# Patient Record
Sex: MALE | Race: WHITE | NOT HISPANIC OR LATINO | Employment: UNEMPLOYED | URBAN - NONMETROPOLITAN AREA
[De-identification: names, ages, dates, MRNs, and addresses within clinical notes are randomized per-mention and may not be internally consistent; named-entity substitution may affect disease eponyms.]

---

## 2021-03-22 PROBLEM — H93.19 TINNITUS: Status: ACTIVE | Noted: 2021-03-22

## 2021-03-22 PROBLEM — N52.9 ERECTILE DYSFUNCTION: Status: ACTIVE | Noted: 2021-03-22

## 2021-03-22 PROBLEM — E78.5 HYPERLIPIDEMIA: Status: ACTIVE | Noted: 2021-03-22

## 2021-03-22 PROBLEM — K40.90 INGUINAL HERNIA: Status: ACTIVE | Noted: 2021-03-22

## 2021-03-22 PROBLEM — E63.0 ESSENTIAL FATTY ACID (EFA) DEFICIENCY: Status: ACTIVE | Noted: 2021-03-22

## 2021-03-22 PROBLEM — L98.499 CALLOUS ULCER: Status: ACTIVE | Noted: 2021-03-22

## 2021-03-22 PROBLEM — G47.33 OSA (OBSTRUCTIVE SLEEP APNEA): Status: ACTIVE | Noted: 2021-03-22

## 2021-03-22 PROBLEM — E66.9 OBESITY: Status: ACTIVE | Noted: 2021-03-22

## 2021-03-22 PROBLEM — E55.9 VITAMIN D DEFICIENCY: Status: ACTIVE | Noted: 2021-03-22

## 2021-03-22 PROBLEM — D64.9 ANEMIA: Status: ACTIVE | Noted: 2021-03-22

## 2021-03-22 PROBLEM — I10 HYPERTENSION: Status: ACTIVE | Noted: 2021-03-22

## 2021-03-22 PROBLEM — K21.9 GERD (GASTROESOPHAGEAL REFLUX DISEASE): Status: ACTIVE | Noted: 2021-03-22

## 2021-03-22 PROBLEM — E11.9 TYPE II DIABETES MELLITUS: Status: ACTIVE | Noted: 2021-03-22

## 2021-03-22 PROBLEM — I65.29 CAROTID ARTERY STENOSIS: Status: ACTIVE | Noted: 2021-03-22

## 2021-03-22 PROBLEM — E88.89 DEFICIENCY OF COENZYME Q10: Status: ACTIVE | Noted: 2021-03-22

## 2021-06-23 ENCOUNTER — APPOINTMENT (OUTPATIENT)
Dept: LAB | Facility: HOSPITAL | Age: 66
End: 2021-06-23
Attending: PHYSICIAN ASSISTANT
Payer: MEDICARE

## 2021-06-23 DIAGNOSIS — Z20.822 CLOSE EXPOSURE TO 2019 NOVEL CORONAVIRUS: ICD-10-CM

## 2021-06-23 LAB — SARS-COV-2 RNA RESP QL NAA+PROBE: DETECTED

## 2021-06-23 PROCEDURE — U0002 COVID-19 LAB TEST NON-CDC: HCPCS | Performed by: PHYSICIAN ASSISTANT

## 2021-06-24 ENCOUNTER — PATIENT OUTREACH (OUTPATIENT)
Dept: INFECTIOUS DISEASES | Facility: HOSPITAL | Age: 66
End: 2021-06-24

## 2021-06-24 DIAGNOSIS — U07.1 COVID-19 VIRUS DETECTED: ICD-10-CM

## 2021-11-09 PROBLEM — H61.20 CERUMEN IMPACTION: Status: ACTIVE | Noted: 2021-11-09

## 2021-12-01 PROBLEM — Z00.00 WELLNESS EXAMINATION: Status: ACTIVE | Noted: 2021-12-01

## 2021-12-01 PROBLEM — M79.10 MYALGIA: Status: ACTIVE | Noted: 2021-12-01

## 2021-12-01 PROBLEM — M25.561 CHRONIC PAIN OF RIGHT KNEE: Status: ACTIVE | Noted: 2021-12-01

## 2021-12-01 PROBLEM — G89.29 CHRONIC PAIN OF RIGHT KNEE: Status: ACTIVE | Noted: 2021-12-01

## 2022-01-10 ENCOUNTER — LAB VISIT (OUTPATIENT)
Dept: LAB | Facility: HOSPITAL | Age: 67
End: 2022-01-10
Attending: NURSE PRACTITIONER
Payer: MEDICARE

## 2022-01-10 DIAGNOSIS — J06.9 ACUTE URI: ICD-10-CM

## 2022-01-10 PROBLEM — H72.91 OTITIS MEDIA, SEROUS, TM RUPTURE, RIGHT: Status: ACTIVE | Noted: 2022-01-10

## 2022-01-10 PROBLEM — F43.0 STRESS REACTION: Status: ACTIVE | Noted: 2022-01-10

## 2022-01-10 PROBLEM — F41.9 ANXIETY: Status: ACTIVE | Noted: 2022-01-10

## 2022-01-10 PROBLEM — H65.91 OTITIS MEDIA, SEROUS, TM RUPTURE, RIGHT: Status: ACTIVE | Noted: 2022-01-10

## 2022-01-10 PROCEDURE — U0005 INFEC AGEN DETEC AMPLI PROBE: HCPCS | Performed by: NURSE PRACTITIONER

## 2022-01-10 PROCEDURE — U0003 INFECTIOUS AGENT DETECTION BY NUCLEIC ACID (DNA OR RNA); SEVERE ACUTE RESPIRATORY SYNDROME CORONAVIRUS 2 (SARS-COV-2) (CORONAVIRUS DISEASE [COVID-19]), AMPLIFIED PROBE TECHNIQUE, MAKING USE OF HIGH THROUGHPUT TECHNOLOGIES AS DESCRIBED BY CMS-2020-01-R: HCPCS | Performed by: NURSE PRACTITIONER

## 2022-01-11 LAB
SARS-COV-2 RNA RESP QL NAA+PROBE: DETECTED
SARS-COV-2- CYCLE NUMBER: 17

## 2022-02-01 PROBLEM — U07.1 COVID-19: Status: ACTIVE | Noted: 2022-02-01

## 2022-02-01 PROBLEM — L30.9 DERMATITIS: Status: ACTIVE | Noted: 2022-02-01

## 2022-02-03 ENCOUNTER — HOSPITAL ENCOUNTER (INPATIENT)
Facility: HOSPITAL | Age: 67
LOS: 4 days | Discharge: HOME | DRG: 390 | End: 2022-02-07
Attending: STUDENT IN AN ORGANIZED HEALTH CARE EDUCATION/TRAINING PROGRAM | Admitting: EMERGENCY MEDICINE
Payer: MEDICARE

## 2022-02-03 DIAGNOSIS — K52.9 GASTROENTERITIS: ICD-10-CM

## 2022-02-03 DIAGNOSIS — K56.609 SBO (SMALL BOWEL OBSTRUCTION): Primary | ICD-10-CM

## 2022-02-03 DIAGNOSIS — Z46.59 ENCOUNTER FOR NASOGASTRIC (NG) TUBE PLACEMENT: ICD-10-CM

## 2022-02-03 LAB
ALBUMIN SERPL BCP-MCNC: 3.6 G/DL (ref 3.5–5.2)
ALP SERPL-CCNC: 67 U/L (ref 55–135)
ALT SERPL W/O P-5'-P-CCNC: 23 U/L (ref 10–44)
ANION GAP SERPL CALC-SCNC: 6 MMOL/L (ref 8–16)
AST SERPL-CCNC: 17 U/L (ref 10–40)
BASOPHILS # BLD AUTO: 0.02 K/UL (ref 0–0.2)
BASOPHILS NFR BLD: 0.5 % (ref 0–1.9)
BILIRUB SERPL-MCNC: 0.6 MG/DL (ref 0.1–1)
BUN SERPL-MCNC: 24 MG/DL (ref 8–23)
CALCIUM SERPL-MCNC: 8.9 MG/DL (ref 8.7–10.5)
CHLORIDE SERPL-SCNC: 105 MMOL/L (ref 95–110)
CO2 SERPL-SCNC: 29 MMOL/L (ref 23–29)
CREAT SERPL-MCNC: 1.1 MG/DL (ref 0.5–1.4)
CTP QC/QA: YES
DIFFERENTIAL METHOD: ABNORMAL
EOSINOPHIL # BLD AUTO: 0.1 K/UL (ref 0–0.5)
EOSINOPHIL NFR BLD: 3.5 % (ref 0–8)
ERYTHROCYTE [DISTWIDTH] IN BLOOD BY AUTOMATED COUNT: 12.5 % (ref 11.5–14.5)
EST. GFR  (AFRICAN AMERICAN): >60 ML/MIN/1.73 M^2
EST. GFR  (NON AFRICAN AMERICAN): >60 ML/MIN/1.73 M^2
GLUCOSE SERPL-MCNC: 121 MG/DL (ref 70–110)
HCT VFR BLD AUTO: 40.1 % (ref 40–54)
HGB BLD-MCNC: 13.7 G/DL (ref 14–18)
IMM GRANULOCYTES # BLD AUTO: 0.01 K/UL (ref 0–0.04)
IMM GRANULOCYTES NFR BLD AUTO: 0.3 % (ref 0–0.5)
LIPASE SERPL-CCNC: 60 U/L (ref 23–300)
LYMPHOCYTES # BLD AUTO: 0.9 K/UL (ref 1–4.8)
LYMPHOCYTES NFR BLD: 25.1 % (ref 18–48)
MCH RBC QN AUTO: 30.9 PG (ref 27–31)
MCHC RBC AUTO-ENTMCNC: 34.2 G/DL (ref 32–36)
MCV RBC AUTO: 90 FL (ref 82–98)
MONOCYTES # BLD AUTO: 0.9 K/UL (ref 0.3–1)
MONOCYTES NFR BLD: 24.3 % (ref 4–15)
NEUTROPHILS # BLD AUTO: 1.7 K/UL (ref 1.8–7.7)
NEUTROPHILS NFR BLD: 46.3 % (ref 38–73)
NRBC BLD-RTO: 0 /100 WBC
PLATELET # BLD AUTO: 172 K/UL (ref 150–450)
PMV BLD AUTO: 9.9 FL (ref 9.2–12.9)
POTASSIUM SERPL-SCNC: 3.6 MMOL/L (ref 3.5–5.1)
PROT SERPL-MCNC: 7.3 G/DL (ref 6–8.4)
RBC # BLD AUTO: 4.44 M/UL (ref 4.6–6.2)
SARS-COV-2 RDRP RESP QL NAA+PROBE: NEGATIVE
SODIUM SERPL-SCNC: 140 MMOL/L (ref 136–145)
WBC # BLD AUTO: 3.67 K/UL (ref 3.9–12.7)

## 2022-02-03 PROCEDURE — 11000001 HC ACUTE MED/SURG PRIVATE ROOM

## 2022-02-03 PROCEDURE — 96361 HYDRATE IV INFUSION ADD-ON: CPT

## 2022-02-03 PROCEDURE — 96374 THER/PROPH/DIAG INJ IV PUSH: CPT

## 2022-02-03 PROCEDURE — 25000003 PHARM REV CODE 250: Performed by: STUDENT IN AN ORGANIZED HEALTH CARE EDUCATION/TRAINING PROGRAM

## 2022-02-03 PROCEDURE — 96372 THER/PROPH/DIAG INJ SC/IM: CPT | Mod: 59

## 2022-02-03 PROCEDURE — 80053 COMPREHEN METABOLIC PANEL: CPT | Performed by: STUDENT IN AN ORGANIZED HEALTH CARE EDUCATION/TRAINING PROGRAM

## 2022-02-03 PROCEDURE — 83690 ASSAY OF LIPASE: CPT | Performed by: STUDENT IN AN ORGANIZED HEALTH CARE EDUCATION/TRAINING PROGRAM

## 2022-02-03 PROCEDURE — 36415 COLL VENOUS BLD VENIPUNCTURE: CPT | Performed by: STUDENT IN AN ORGANIZED HEALTH CARE EDUCATION/TRAINING PROGRAM

## 2022-02-03 PROCEDURE — 63600175 PHARM REV CODE 636 W HCPCS: Performed by: STUDENT IN AN ORGANIZED HEALTH CARE EDUCATION/TRAINING PROGRAM

## 2022-02-03 PROCEDURE — 99285 EMERGENCY DEPT VISIT HI MDM: CPT | Mod: 25

## 2022-02-03 PROCEDURE — 25500020 PHARM REV CODE 255: Performed by: STUDENT IN AN ORGANIZED HEALTH CARE EDUCATION/TRAINING PROGRAM

## 2022-02-03 PROCEDURE — 96375 TX/PRO/DX INJ NEW DRUG ADDON: CPT

## 2022-02-03 PROCEDURE — U0002 COVID-19 LAB TEST NON-CDC: HCPCS | Performed by: STUDENT IN AN ORGANIZED HEALTH CARE EDUCATION/TRAINING PROGRAM

## 2022-02-03 PROCEDURE — 85025 COMPLETE CBC W/AUTO DIFF WBC: CPT | Performed by: STUDENT IN AN ORGANIZED HEALTH CARE EDUCATION/TRAINING PROGRAM

## 2022-02-03 RX ORDER — FAMOTIDINE 10 MG/ML
20 INJECTION INTRAVENOUS EVERY 12 HOURS
Status: DISCONTINUED | OUTPATIENT
Start: 2022-02-03 | End: 2022-02-07 | Stop reason: HOSPADM

## 2022-02-03 RX ORDER — ONDANSETRON 2 MG/ML
4 INJECTION INTRAMUSCULAR; INTRAVENOUS
Status: COMPLETED | OUTPATIENT
Start: 2022-02-03 | End: 2022-02-03

## 2022-02-03 RX ORDER — METOCLOPRAMIDE 10 MG/1
10 TABLET ORAL EVERY 6 HOURS
Qty: 30 TABLET | Refills: 0 | Status: SHIPPED | OUTPATIENT
Start: 2022-02-03 | End: 2023-02-16

## 2022-02-03 RX ORDER — DICYCLOMINE HYDROCHLORIDE 10 MG/ML
20 INJECTION INTRAMUSCULAR
Status: COMPLETED | OUTPATIENT
Start: 2022-02-03 | End: 2022-02-03

## 2022-02-03 RX ORDER — SODIUM CHLORIDE 0.9 % (FLUSH) 0.9 %
10 SYRINGE (ML) INJECTION
Status: DISCONTINUED | OUTPATIENT
Start: 2022-02-03 | End: 2022-02-07 | Stop reason: HOSPADM

## 2022-02-03 RX ORDER — ACETAMINOPHEN 325 MG/1
650 TABLET ORAL EVERY 8 HOURS PRN
Status: DISCONTINUED | OUTPATIENT
Start: 2022-02-03 | End: 2022-02-07 | Stop reason: HOSPADM

## 2022-02-03 RX ORDER — MORPHINE SULFATE 2 MG/ML
2 INJECTION, SOLUTION INTRAMUSCULAR; INTRAVENOUS EVERY 4 HOURS PRN
Status: DISCONTINUED | OUTPATIENT
Start: 2022-02-03 | End: 2022-02-07 | Stop reason: HOSPADM

## 2022-02-03 RX ORDER — ONDANSETRON 2 MG/ML
4 INJECTION INTRAMUSCULAR; INTRAVENOUS EVERY 8 HOURS PRN
Status: DISCONTINUED | OUTPATIENT
Start: 2022-02-03 | End: 2022-02-07 | Stop reason: HOSPADM

## 2022-02-03 RX ORDER — TALC
6 POWDER (GRAM) TOPICAL NIGHTLY PRN
Status: DISCONTINUED | OUTPATIENT
Start: 2022-02-03 | End: 2022-02-07 | Stop reason: HOSPADM

## 2022-02-03 RX ORDER — DICYCLOMINE HYDROCHLORIDE 20 MG/1
20 TABLET ORAL 2 TIMES DAILY
Qty: 20 TABLET | Refills: 0 | Status: SHIPPED | OUTPATIENT
Start: 2022-02-03 | End: 2023-02-16

## 2022-02-03 RX ORDER — FAMOTIDINE 10 MG/ML
20 INJECTION INTRAVENOUS
Status: COMPLETED | OUTPATIENT
Start: 2022-02-03 | End: 2022-02-03

## 2022-02-03 RX ADMIN — ONDANSETRON HYDROCHLORIDE 4 MG: 2 SOLUTION INTRAMUSCULAR; INTRAVENOUS at 03:02

## 2022-02-03 RX ADMIN — MORPHINE SULFATE 2 MG: 2 INJECTION, SOLUTION INTRAMUSCULAR; INTRAVENOUS at 08:02

## 2022-02-03 RX ADMIN — FAMOTIDINE 20 MG: 10 INJECTION INTRAVENOUS at 08:02

## 2022-02-03 RX ADMIN — FAMOTIDINE 20 MG: 10 INJECTION INTRAVENOUS at 03:02

## 2022-02-03 RX ADMIN — SODIUM CHLORIDE, SODIUM LACTATE, POTASSIUM CHLORIDE, AND CALCIUM CHLORIDE 1000 ML: .6; .31; .03; .02 INJECTION, SOLUTION INTRAVENOUS at 02:02

## 2022-02-03 RX ADMIN — DICYCLOMINE HYDROCHLORIDE 20 MG: 20 INJECTION, SOLUTION INTRAMUSCULAR at 03:02

## 2022-02-03 RX ADMIN — IOHEXOL 100 ML: 350 INJECTION, SOLUTION INTRAVENOUS at 03:02

## 2022-02-03 NOTE — ED NOTES
Pt was also given dexamethasone and rocephin IM injections yesterday for ruptured TM and dermatitis flare up.

## 2022-02-03 NOTE — ED NOTES
Charge nurse put NG tube down 14 to low wall suction after portable chest x-ray for confirmation we will be transporting to the med surg floor 649.

## 2022-02-03 NOTE — ED PROVIDER NOTES
Encounter Date: 2/3/2022       History     Chief Complaint   Patient presents with    Vomiting    Diarrhea     Pt stated that he has been experiencing abdominal cramping with n/v/d since Monday. Seen by PCP on Tuesday and Rx zofran - pt stating that symptoms are not improving.      66-year-old male with history of diabetes, hypertension, high cholesterol, acid reflux presents with diffuse abdominal pain which she describes as cramping since Monday.  Patient says the pain is episodic with associated nonbloody nonbilious vomiting and nonbloody diarrhea.  Patient denies any travel, antibiotic use, fever, trauma, dysuria.  Patient does have history of hernia and constipate        Review of patient's allergies indicates:   Allergen Reactions    Codeine      Past Medical History:   Diagnosis Date    Arthritis     Diabetes     GERD (gastroesophageal reflux disease)     Hyperlipidemia     Hypertension     PATRICIA (obstructive sleep apnea)      Past Surgical History:   Procedure Laterality Date    HERNIA REPAIR      MYRINGOTOMY W/ TUBES       Family History   Problem Relation Age of Onset    Colon cancer Mother     Diabetes Mother     Coronary artery disease Father     Cancer Father     Diabetes Father      Social History     Tobacco Use    Smoking status: Never Smoker   Substance Use Topics    Alcohol use: Not Currently     Review of Systems   Constitutional: Negative.    HENT: Negative.    Respiratory: Negative.    Cardiovascular: Negative.    Gastrointestinal: Positive for abdominal pain, diarrhea, nausea and vomiting.   Genitourinary: Negative.    Musculoskeletal: Negative.    Skin: Negative.    Neurological: Negative.    Psychiatric/Behavioral: Negative.    All other systems reviewed and are negative.      Physical Exam     Initial Vitals [02/03/22 1403]   BP Pulse Resp Temp SpO2   135/81 62 18 98.2 °F (36.8 °C) 100 %      MAP       --         Physical Exam    Nursing note and vitals  reviewed.  Constitutional: Vital signs are normal. He appears well-developed and well-nourished.   HENT:   Head: Normocephalic and atraumatic.   Eyes: Conjunctivae and lids are normal.   Neck: Trachea normal. Neck supple.   Cardiovascular: Normal rate, regular rhythm, normal heart sounds and normal pulses.   Pulmonary/Chest: Breath sounds normal. He has no wheezes. He has no rhonchi.   Abdominal: Abdomen is soft. Bowel sounds are normal. He exhibits distension. There is abdominal tenderness.   Diffuse tenderness to palpation. nonperitontic There is guarding. There is no rebound.   Musculoskeletal:         General: Normal range of motion.      Cervical back: Neck supple.     Neurological: He is alert and oriented to person, place, and time. He has normal strength. GCS eye subscore is 4. GCS verbal subscore is 5. GCS motor subscore is 6.   Skin: Skin is warm. Capillary refill takes less than 2 seconds.   Psychiatric: He has a normal mood and affect. His speech is normal. Thought content normal.         ED Course   Procedures  Labs Reviewed   CBC W/ AUTO DIFFERENTIAL - Abnormal; Notable for the following components:       Result Value    WBC 3.67 (*)     RBC 4.44 (*)     Hemoglobin 13.7 (*)     Gran # (ANC) 1.7 (*)     Lymph # 0.9 (*)     Mono % 24.3 (*)     All other components within normal limits   COMPREHENSIVE METABOLIC PANEL - Abnormal; Notable for the following components:    Glucose 121 (*)     BUN 24 (*)     Anion Gap 6 (*)     All other components within normal limits   LIPASE   SARS-COV-2 RDRP GENE          Imaging Results          CT Abdomen Pelvis With Contrast (Final result)  Result time 02/03/22 15:59:16    Final result by Hu Mcghee MD (02/03/22 15:59:16)                 Impression:      Findings compatible with small-bowel obstruction.    Colonic diverticulosis.    Additional observations as above.      Electronically signed by: Hu Mcghee MD  Date:    02/03/2022  Time:    15:59              Narrative:    EXAMINATION:  CT ABDOMEN PELVIS WITH CONTRAST    CLINICAL HISTORY:  Abdominal pain, acute, nonlocalized;    CT/nuclear cardiac exams in previous 12 months: None    TECHNIQUE:  Axial CT images were obtained and evaluated with coronal reformatted images.  Iterative reconstruction technique was used.    COMPARISON:  CT abdomen/pelvis 06/01/2019    FINDINGS:  Visualized lungs are clear.  No abnormality identified of the liver, pancreas, spleen or adrenal glands.  Kidneys enhance symmetrically and contain small cysts.  Gallbladder is unremarkable.  There are dilated, fluid-filled segments of mid small bowel with collapse of the distal small bowel, compatible with bowel obstruction.  Appendix appears normal.  There is colonic diverticulosis.  Urinary bladder is unremarkable.  Abdominal aorta is normal in caliber and exhibits moderate atherosclerotic change.  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                                 Medications   dicyclomine injection 20 mg (20 mg Intramuscular Given 2/3/22 1512)   ondansetron injection 4 mg (4 mg Intravenous Given 2/3/22 1506)   famotidine (PF) injection 20 mg (20 mg Intravenous Given 2/3/22 1508)   lactated ringers bolus 1,000 mL (0 mLs Intravenous Stopped 2/3/22 1555)   iohexoL (OMNIPAQUE 350) injection 100 mL (100 mLs Intravenous Given 2/3/22 1530)     Medical Decision Making:   Initial Assessment:   66-year-old male with history of diabetes, hypertension, high cholesterol, acid reflux presents with diffuse abdominal pain which she describes as cramping since Monday.  Afebrile vitals stable.  Physical noted.  Will get labs and imaging to rule out pancreatitis, hepatitis, or other intra-abdominal process.  Symptomatic treatment.  Clinical Tests:   Lab Tests: Ordered and Reviewed  The following lab test(s) were unremarkable: CBC, CMP and Lipase  Radiological Study: Ordered and Reviewed  Medical Tests: Reviewed and Ordered             ED Course as of 02/03/22 8638    Thu Feb 03, 2022   1604 CT Abdomen Pelvis With Contrast [HD]   1653 Sbo. Abdomen nonperitonitic. Spoke to surgery who will see patient. Admit to medicine.  [HD]      ED Course User Index  [HD] Benito Garsia MD             Clinical Impression:   Final diagnoses:  [K52.9] Gastroenteritis  [K56.609] SBO (small bowel obstruction) (Primary)          ED Disposition Condition    Admit               Benito Garsia MD  02/03/22 6918

## 2022-02-04 LAB
ALBUMIN SERPL BCP-MCNC: 3.1 G/DL (ref 3.5–5.2)
ALP SERPL-CCNC: 60 U/L (ref 55–135)
ALT SERPL W/O P-5'-P-CCNC: 19 U/L (ref 10–44)
ANION GAP SERPL CALC-SCNC: 3 MMOL/L (ref 8–16)
AST SERPL-CCNC: 15 U/L (ref 10–40)
BASOPHILS # BLD AUTO: ABNORMAL K/UL (ref 0–0.2)
BASOPHILS NFR BLD: 0 % (ref 0–1.9)
BILIRUB SERPL-MCNC: 0.5 MG/DL (ref 0.1–1)
BUN SERPL-MCNC: 17 MG/DL (ref 8–23)
CALCIUM SERPL-MCNC: 8.4 MG/DL (ref 8.7–10.5)
CHLORIDE SERPL-SCNC: 108 MMOL/L (ref 95–110)
CO2 SERPL-SCNC: 30 MMOL/L (ref 23–29)
CREAT SERPL-MCNC: 1 MG/DL (ref 0.5–1.4)
DIFFERENTIAL METHOD: ABNORMAL
EOSINOPHIL # BLD AUTO: ABNORMAL K/UL (ref 0–0.5)
EOSINOPHIL NFR BLD: 2 % (ref 0–8)
ERYTHROCYTE [DISTWIDTH] IN BLOOD BY AUTOMATED COUNT: 12.3 % (ref 11.5–14.5)
EST. GFR  (AFRICAN AMERICAN): >60 ML/MIN/1.73 M^2
EST. GFR  (NON AFRICAN AMERICAN): >60 ML/MIN/1.73 M^2
ESTIMATED AVG GLUCOSE: 131 MG/DL (ref 68–131)
GLUCOSE SERPL-MCNC: 100 MG/DL (ref 70–110)
HBA1C MFR BLD: 6.2 % (ref 4–5.6)
HCT VFR BLD AUTO: 37.3 % (ref 40–54)
HGB BLD-MCNC: 12.5 G/DL (ref 14–18)
IMM GRANULOCYTES # BLD AUTO: ABNORMAL K/UL (ref 0–0.04)
IMM GRANULOCYTES NFR BLD AUTO: ABNORMAL % (ref 0–0.5)
LYMPHOCYTES # BLD AUTO: ABNORMAL K/UL (ref 1–4.8)
LYMPHOCYTES NFR BLD: 28 % (ref 18–48)
MCH RBC QN AUTO: 30.3 PG (ref 27–31)
MCHC RBC AUTO-ENTMCNC: 33.5 G/DL (ref 32–36)
MCV RBC AUTO: 90 FL (ref 82–98)
MONOCYTES # BLD AUTO: ABNORMAL K/UL (ref 0.3–1)
MONOCYTES NFR BLD: 26 % (ref 4–15)
NEUTROPHILS NFR BLD: 22 % (ref 38–73)
NEUTS BAND NFR BLD MANUAL: 22 %
NRBC BLD-RTO: 0 /100 WBC
PLATELET # BLD AUTO: 160 K/UL (ref 150–450)
PMV BLD AUTO: 10.1 FL (ref 9.2–12.9)
POTASSIUM SERPL-SCNC: 3.9 MMOL/L (ref 3.5–5.1)
PROT SERPL-MCNC: 6.5 G/DL (ref 6–8.4)
RBC # BLD AUTO: 4.13 M/UL (ref 4.6–6.2)
SODIUM SERPL-SCNC: 141 MMOL/L (ref 136–145)
WBC # BLD AUTO: 3.67 K/UL (ref 3.9–12.7)

## 2022-02-04 PROCEDURE — 25000003 PHARM REV CODE 250: Performed by: INTERNAL MEDICINE

## 2022-02-04 PROCEDURE — 80053 COMPREHEN METABOLIC PANEL: CPT | Performed by: STUDENT IN AN ORGANIZED HEALTH CARE EDUCATION/TRAINING PROGRAM

## 2022-02-04 PROCEDURE — 36415 COLL VENOUS BLD VENIPUNCTURE: CPT | Performed by: INTERNAL MEDICINE

## 2022-02-04 PROCEDURE — 83036 HEMOGLOBIN GLYCOSYLATED A1C: CPT | Performed by: INTERNAL MEDICINE

## 2022-02-04 PROCEDURE — 36415 COLL VENOUS BLD VENIPUNCTURE: CPT | Performed by: STUDENT IN AN ORGANIZED HEALTH CARE EDUCATION/TRAINING PROGRAM

## 2022-02-04 PROCEDURE — 85027 COMPLETE CBC AUTOMATED: CPT | Performed by: STUDENT IN AN ORGANIZED HEALTH CARE EDUCATION/TRAINING PROGRAM

## 2022-02-04 PROCEDURE — 25000003 PHARM REV CODE 250: Performed by: SURGERY

## 2022-02-04 PROCEDURE — 85007 BL SMEAR W/DIFF WBC COUNT: CPT | Performed by: STUDENT IN AN ORGANIZED HEALTH CARE EDUCATION/TRAINING PROGRAM

## 2022-02-04 PROCEDURE — S5010 5% DEXTROSE AND 0.45% SALINE: HCPCS | Performed by: INTERNAL MEDICINE

## 2022-02-04 PROCEDURE — 11000001 HC ACUTE MED/SURG PRIVATE ROOM

## 2022-02-04 RX ORDER — GLUCAGON 1 MG
1 KIT INJECTION
Status: DISCONTINUED | OUTPATIENT
Start: 2022-02-04 | End: 2022-02-07 | Stop reason: HOSPADM

## 2022-02-04 RX ORDER — EZETIMIBE 10 MG/1
10 TABLET ORAL DAILY
Status: DISCONTINUED | OUTPATIENT
Start: 2022-02-04 | End: 2022-02-07 | Stop reason: HOSPADM

## 2022-02-04 RX ORDER — BISACODYL 10 MG
10 SUPPOSITORY, RECTAL RECTAL DAILY
Status: DISCONTINUED | OUTPATIENT
Start: 2022-02-04 | End: 2022-02-07 | Stop reason: HOSPADM

## 2022-02-04 RX ORDER — ATORVASTATIN CALCIUM 20 MG/1
20 TABLET, FILM COATED ORAL DAILY
Status: DISCONTINUED | OUTPATIENT
Start: 2022-02-04 | End: 2022-02-07 | Stop reason: HOSPADM

## 2022-02-04 RX ORDER — METOPROLOL TARTRATE 25 MG/1
25 TABLET, FILM COATED ORAL DAILY
Status: DISCONTINUED | OUTPATIENT
Start: 2022-02-04 | End: 2022-02-07 | Stop reason: HOSPADM

## 2022-02-04 RX ORDER — INSULIN ASPART 100 [IU]/ML
0-5 INJECTION, SOLUTION INTRAVENOUS; SUBCUTANEOUS EVERY 6 HOURS PRN
Status: DISCONTINUED | OUTPATIENT
Start: 2022-02-04 | End: 2022-02-07 | Stop reason: HOSPADM

## 2022-02-04 RX ORDER — DEXTROSE MONOHYDRATE AND SODIUM CHLORIDE 5; .45 G/100ML; G/100ML
INJECTION, SOLUTION INTRAVENOUS CONTINUOUS
Status: DISCONTINUED | OUTPATIENT
Start: 2022-02-04 | End: 2022-02-05

## 2022-02-04 RX ADMIN — METOPROLOL TARTRATE 25 MG: 25 TABLET, FILM COATED ORAL at 09:02

## 2022-02-04 RX ADMIN — BISACODYL 10 MG: 10 SUPPOSITORY RECTAL at 03:02

## 2022-02-04 RX ADMIN — DEXTROSE AND SODIUM CHLORIDE: 5; .45 INJECTION, SOLUTION INTRAVENOUS at 10:02

## 2022-02-04 RX ADMIN — EZETIMIBE 10 MG: 10 TABLET ORAL at 09:02

## 2022-02-04 RX ADMIN — FAMOTIDINE 20 MG: 10 INJECTION INTRAVENOUS at 09:02

## 2022-02-04 RX ADMIN — ATORVASTATIN CALCIUM 20 MG: 20 TABLET, FILM COATED ORAL at 09:02

## 2022-02-04 RX ADMIN — FAMOTIDINE 20 MG: 10 INJECTION INTRAVENOUS at 08:02

## 2022-02-04 NOTE — ASSESSMENT & PLAN NOTE
Body mass index is 37.31 kg/m². Morbid obesity complicates all aspects of disease management from diagnostic modalities to treatment. Weight loss encouraged and health benefits explained to patient.

## 2022-02-04 NOTE — SUBJECTIVE & OBJECTIVE
Past Medical History:   Diagnosis Date    Arthritis     Diabetes     GERD (gastroesophageal reflux disease)     Hyperlipidemia     Hypertension     PATRICIA (obstructive sleep apnea)        Past Surgical History:   Procedure Laterality Date    HERNIA REPAIR      MYRINGOTOMY W/ TUBES         Review of patient's allergies indicates:   Allergen Reactions    Codeine        No current facility-administered medications on file prior to encounter.     Current Outpatient Medications on File Prior to Encounter   Medication Sig    cholecalciferol, vitamin D3, 10 mcg (400 unit) Cap Take 400 Units by mouth once daily.    clobetasol 0.05% (TEMOVATE) 0.05 % Oint Apply topically 2 (two) times daily.    ezetimibe (ZETIA) 10 mg tablet TAKE 1 TABLET BY MOUTH DAILY    FLUoxetine 10 MG capsule Take 1 capsule (10 mg total) by mouth once daily.    lisinopriL 10 MG tablet TAKE 1 TABLET BY MOUTH DAILY    metFORMIN (GLUCOPHAGE) 1000 MG tablet TAKE 1 TABLET BY MOUTH TWICE DAILY    metoprolol tartrate (LOPRESSOR) 25 MG tablet TAKE 1 TABLET BY MOUTH DAILY    ondansetron (ZOFRAN-ODT) 4 MG TbDL Take 1 tablet (4 mg total) by mouth every 6 (six) hours as needed (nausea and vomiting).    rosuvastatin (CRESTOR) 20 MG tablet TAKE 1 TABLET BY MOUTH DAILY    sildenafil (REVATIO) 20 mg Tab Take 1 tablet (20 mg total) by mouth daily as needed (erectil dysfunction).    ALPRAZolam (XANAX) 0.25 MG tablet Take 1 tablet (0.25 mg total) by mouth daily as needed for Anxiety.     Family History     Problem Relation (Age of Onset)    Cancer Father    Colon cancer Mother    Coronary artery disease Father    Diabetes Mother, Father        Tobacco Use    Smoking status: Never Smoker    Smokeless tobacco: Never Used   Substance and Sexual Activity    Alcohol use: Yes     Alcohol/week: 2.0 standard drinks     Types: 2 Glasses of wine per week    Drug use: Never    Sexual activity: Not on file     Review of Systems   Constitutional: Negative for  chills and fever.   HENT: Negative for rhinorrhea, sneezing and sore throat.    Eyes: Negative for pain and visual disturbance.   Respiratory: Negative for cough and shortness of breath.    Cardiovascular: Negative for chest pain.   Gastrointestinal: Positive for abdominal distention and abdominal pain. Negative for constipation and diarrhea.   Endocrine: Negative for cold intolerance and heat intolerance.   Genitourinary: Negative for difficulty urinating.   Musculoskeletal: Negative for arthralgias and joint swelling.   Skin: Negative for rash.   Allergic/Immunologic: Negative for environmental allergies.   Neurological: Negative for dizziness, syncope and weakness.   Hematological: Does not bruise/bleed easily.   Psychiatric/Behavioral: Negative for dysphoric mood. The patient is not nervous/anxious.      Objective:     Vital Signs (Most Recent):  Temp: 98.4 °F (36.9 °C) (02/04/22 0735)  Pulse: 63 (02/04/22 0735)  Resp: 18 (02/04/22 0735)  BP: 115/67 (02/04/22 0735)  SpO2: (!) 93 % (02/04/22 0735) Vital Signs (24h Range):  Temp:  [98 °F (36.7 °C)-99.3 °F (37.4 °C)] 98.4 °F (36.9 °C)  Pulse:  [56-76] 63  Resp:  [16-20] 18  SpO2:  [91 %-100 %] 93 %  BP: (108-141)/(55-81) 115/67     Weight: 117.9 kg (260 lb)  Body mass index is 37.31 kg/m².    Physical Exam  Vitals and nursing note reviewed.   Constitutional:       Appearance: Normal appearance.   HENT:      Head: Normocephalic and atraumatic.      Nose: Nose normal.   Eyes:      Extraocular Movements: Extraocular movements intact.      Pupils: Pupils are equal, round, and reactive to light.   Cardiovascular:      Rate and Rhythm: Normal rate and regular rhythm.      Heart sounds: No murmur heard.  No friction rub. No gallop.    Pulmonary:      Effort: Pulmonary effort is normal.      Breath sounds: Normal breath sounds.   Abdominal:      General: Abdomen is flat. Bowel sounds are normal. There is distension.      Palpations: Abdomen is soft.      Tenderness: There  is abdominal tenderness.   Musculoskeletal:         General: No swelling or deformity.      Cervical back: Normal range of motion and neck supple.   Skin:     General: Skin is warm and dry.      Capillary Refill: Capillary refill takes less than 2 seconds.   Neurological:      General: No focal deficit present.      Mental Status: He is alert and oriented to person, place, and time.   Psychiatric:         Mood and Affect: Mood normal.         Behavior: Behavior normal.           CRANIAL NERVES     CN III, IV, VI   Pupils are equal, round, and reactive to light.       Significant Labs: All pertinent labs within the past 24 hours have been reviewed.    Significant Imaging: I have reviewed all pertinent imaging results/findings within the past 24 hours.

## 2022-02-04 NOTE — ASSESSMENT & PLAN NOTE
Unsure if enteritis/ileus with partial obstruction.  Similar to 2019  NGT decompression  Hydration  Recheck x-ray in AM

## 2022-02-04 NOTE — HPI
Chief Complaint   Patient presents with    Vomiting    Diarrhea       Pt stated that he has been experiencing abdominal cramping with n/v/d since Monday. Seen by PCP on Tuesday and Rx zofran - pt stating that symptoms are not improving.       66-year-old male with history of diabetes, hypertension, high cholesterol, acid reflux presents with diffuse abdominal pain which she describes as cramping since Monday.  Patient says the pain is episodic with associated nonbloody nonbilious vomiting and nonbloody diarrhea.  Patient denies any travel, antibiotic use, fever, trauma, dysuria.  Patient does have history of hernia and constipate

## 2022-02-04 NOTE — ASSESSMENT & PLAN NOTE
Last A1c reviewed-   Lab Results   Component Value Date    HGBA1C 6.3 (H) 10/27/2021     Most recent fingerstick glucose reviewed- No results for input(s): POCTGLUCOSE in the last 24 hours.  Current correctional scale:  Low  Anti-hyperglycemic dose as follows-   Antihyperglycemics (From admission, onward)            None        Hold Oral hypoglycemics while patient is in the hospital.

## 2022-02-04 NOTE — H&P
[facility] Medicine  History & Physical    Patient Name: Luc Resendez  MRN: [id number]  Patient Class: IP- Inpatient  Admission Date: 2/3/2022  Attending Physician: Dustin Juarez Jr., MD   Primary Care Provider: Dustin Juarez Jr, MD         Patient information was obtained from ER records.     Subjective:     Principal Problem:<principal problem not specified>    Chief Complaint:   Chief Complaint   Patient presents with    Vomiting    Diarrhea     Pt stated that he has been experiencing abdominal cramping with n/v/d since Monday. Seen by PCP on Tuesday and Rx zofran - pt stating that symptoms are not improving.         HPI:   Chief Complaint   Patient presents with    Vomiting    Diarrhea       Pt stated that he has been experiencing abdominal cramping with n/v/d since Monday. Seen by PCP on Tuesday and Rx zofran - pt stating that symptoms are not improving.       66-year-old male with history of diabetes, hypertension, high cholesterol, acid reflux presents with diffuse abdominal pain which she describes as cramping since Monday.  Patient says the pain is episodic with associated nonbloody nonbilious vomiting and nonbloody diarrhea.  Patient denies any travel, antibiotic use, fever, trauma, dysuria.  Patient does have history of hernia and constipate            Past Medical History:   Diagnosis Date    Arthritis     Diabetes     GERD (gastroesophageal reflux disease)     Hyperlipidemia     Hypertension     PATRICIA (obstructive sleep apnea)        Past Surgical History:   Procedure Laterality Date    HERNIA REPAIR      MYRINGOTOMY W/ TUBES         Review of patient's allergies indicates:   Allergen Reactions    Codeine        No current facility-administered medications on file prior to encounter.     Current Outpatient Medications on File Prior to Encounter   Medication Sig    cholecalciferol, vitamin D3, 10 mcg (400 unit) Cap Take 400 Units by mouth once daily.    clobetasol  0.05% (TEMOVATE) 0.05 % Oint Apply topically 2 (two) times daily.    ezetimibe (ZETIA) 10 mg tablet TAKE 1 TABLET BY MOUTH DAILY    FLUoxetine 10 MG capsule Take 1 capsule (10 mg total) by mouth once daily.    lisinopriL 10 MG tablet TAKE 1 TABLET BY MOUTH DAILY    metFORMIN (GLUCOPHAGE) 1000 MG tablet TAKE 1 TABLET BY MOUTH TWICE DAILY    metoprolol tartrate (LOPRESSOR) 25 MG tablet TAKE 1 TABLET BY MOUTH DAILY    ondansetron (ZOFRAN-ODT) 4 MG TbDL Take 1 tablet (4 mg total) by mouth every 6 (six) hours as needed (nausea and vomiting).    rosuvastatin (CRESTOR) 20 MG tablet TAKE 1 TABLET BY MOUTH DAILY    sildenafil (REVATIO) 20 mg Tab Take 1 tablet (20 mg total) by mouth daily as needed (erectil dysfunction).    ALPRAZolam (XANAX) 0.25 MG tablet Take 1 tablet (0.25 mg total) by mouth daily as needed for Anxiety.     Family History     Problem Relation (Age of Onset)    Cancer Father    Colon cancer Mother    Coronary artery disease Father    Diabetes Mother, Father        Tobacco Use    Smoking status: Never Smoker    Smokeless tobacco: Never Used   Substance and Sexual Activity    Alcohol use: Yes     Alcohol/week: 2.0 standard drinks     Types: 2 Glasses of wine per week    Drug use: Never    Sexual activity: Not on file     Review of Systems   Constitutional: Negative for chills and fever.   HENT: Negative for rhinorrhea, sneezing and sore throat.    Eyes: Negative for pain and visual disturbance.   Respiratory: Negative for cough and shortness of breath.    Cardiovascular: Negative for chest pain.   Gastrointestinal: Positive for abdominal distention and abdominal pain. Negative for constipation and diarrhea.   Endocrine: Negative for cold intolerance and heat intolerance.   Genitourinary: Negative for difficulty urinating.   Musculoskeletal: Negative for arthralgias and joint swelling.   Skin: Negative for rash.   Allergic/Immunologic: Negative for environmental allergies.   Neurological:  Negative for dizziness, syncope and weakness.   Hematological: Does not bruise/bleed easily.   Psychiatric/Behavioral: Negative for dysphoric mood. The patient is not nervous/anxious.      Objective:     Vital Signs (Most Recent):  Temp: 98.4 °F (36.9 °C) (02/04/22 0735)  Pulse: 63 (02/04/22 0735)  Resp: 18 (02/04/22 0735)  BP: 115/67 (02/04/22 0735)  SpO2: (!) 93 % (02/04/22 0735) Vital Signs (24h Range):  Temp:  [98 °F (36.7 °C)-99.3 °F (37.4 °C)] 98.4 °F (36.9 °C)  Pulse:  [56-76] 63  Resp:  [16-20] 18  SpO2:  [91 %-100 %] 93 %  BP: (108-141)/(55-81) 115/67     Weight: 117.9 kg (260 lb)  Body mass index is 37.31 kg/m².    Physical Exam  Vitals and nursing note reviewed.   Constitutional:       Appearance: Normal appearance.   HENT:      Head: Normocephalic and atraumatic.      Nose: Nose normal.   Eyes:      Extraocular Movements: Extraocular movements intact.      Pupils: Pupils are equal, round, and reactive to light.   Cardiovascular:      Rate and Rhythm: Normal rate and regular rhythm.      Heart sounds: No murmur heard.  No friction rub. No gallop.    Pulmonary:      Effort: Pulmonary effort is normal.      Breath sounds: Normal breath sounds.   Abdominal:      General: Abdomen is flat. Bowel sounds are normal. There is distension.      Palpations: Abdomen is soft.      Tenderness: There is abdominal tenderness.   Musculoskeletal:         General: No swelling or deformity.      Cervical back: Normal range of motion and neck supple.   Skin:     General: Skin is warm and dry.      Capillary Refill: Capillary refill takes less than 2 seconds.   Neurological:      General: No focal deficit present.      Mental Status: He is alert and oriented to person, place, and time.   Psychiatric:         Mood and Affect: Mood normal.         Behavior: Behavior normal.           CRANIAL NERVES     CN III, IV, VI   Pupils are equal, round, and reactive to light.       Significant Labs: All pertinent labs within the past 24  hours have been reviewed.    Significant Imaging: I have reviewed all pertinent imaging results/findings within the past 24 hours.    Assessment/Plan:     Small bowel obstruction  GS following.  NGT placed.  Now passing flatus.      Continue treatment per GS recs      Type II diabetes mellitus  Last A1c reviewed-   Lab Results   Component Value Date    HGBA1C 6.3 (H) 10/27/2021     Most recent fingerstick glucose reviewed- No results for input(s): POCTGLUCOSE in the last 24 hours.  Current correctional scale:  Low  Anti-hyperglycemic dose as follows-   Antihyperglycemics (From admission, onward)            None        Hold Oral hypoglycemics while patient is in the hospital.        GERD (gastroesophageal reflux disease)  Continue PPI      Hyperlipidemia  Restart statin when stable    Hypertension  stable    BP Readings from Last 3 Encounters:   02/04/22 115/67   02/01/22 124/72   01/10/22 124/72           Obesity  Body mass index is 37.31 kg/m². Morbid obesity complicates all aspects of disease management from diagnostic modalities to treatment. Weight loss encouraged and health benefits explained to patient.           VTE Risk Mitigation (From admission, onward)         Ordered     IP VTE HIGH RISK PATIENT  Once         02/03/22 1753     Place sequential compression device  Until discontinued         02/03/22 1753                   Dustin Juarez Jr, MD  Department of Hospital Medicine   [facility]

## 2022-02-04 NOTE — CONSULTS
[facility] Surg  General Surgery  Consult Note  2/3/22    Patient Name: Luc Resendez  MRN: [id number]  Code Status: Full Code  Admission Date: 2/3/2022  Hospital Length of Stay: 0 days  Attending Physician: Dustin Juarez Jr., MD  Primary Care Provider: Dustin Juarez Jr, MD    Patient information was obtained from patient, spouse/SO, past medical records and ER records.     Inpatient consult to General Surgery  Consult performed by: Anastasiia Walters MD  Consult ordered by: Benito Garsia MD  Reason for consult: SBO  Assessment/Recommendations: See note        Subjective:     Principal Problem: <principal problem not specified>    History of Present Illness: Few days of abdominal pain that started as nausea and vomiting with diarrhea.  Family members were also sick at home.  Took Immodium for diarrhea and continued to have pain and nausea/vomiting.  Last vomitus was last night.  Pain continues and feels similar to hospitalization after hernia repair.  Reports some flatus this afternoon.  Last BM was this AM.  Persistent nausea.  No fevers or chills.  No constipation prior.  Some bloating.  Indigestion and reflux.  No chills.        No current facility-administered medications on file prior to encounter.     Current Outpatient Medications on File Prior to Encounter   Medication Sig    ALPRAZolam (XANAX) 0.25 MG tablet Take 1 tablet (0.25 mg total) by mouth daily as needed for Anxiety.    cholecalciferol, vitamin D3, 10 mcg (400 unit) Cap Take 400 Units by mouth once daily.    clobetasol 0.05% (TEMOVATE) 0.05 % Oint Apply topically 2 (two) times daily.    ezetimibe (ZETIA) 10 mg tablet TAKE 1 TABLET BY MOUTH DAILY    FLUoxetine 10 MG capsule Take 1 capsule (10 mg total) by mouth once daily.    lisinopriL 10 MG tablet TAKE 1 TABLET BY MOUTH DAILY    metFORMIN (GLUCOPHAGE) 1000 MG tablet TAKE 1 TABLET BY MOUTH TWICE DAILY    metoprolol tartrate (LOPRESSOR) 25 MG tablet TAKE 1 TABLET BY MOUTH DAILY     ondansetron (ZOFRAN-ODT) 4 MG TbDL Take 1 tablet (4 mg total) by mouth every 6 (six) hours as needed (nausea and vomiting).    rosuvastatin (CRESTOR) 20 MG tablet TAKE 1 TABLET BY MOUTH DAILY    sildenafil (REVATIO) 20 mg Tab Take 1 tablet (20 mg total) by mouth daily as needed (erectil dysfunction).       Review of patient's allergies indicates:   Allergen Reactions    Codeine        Past Medical History:   Diagnosis Date    Arthritis     Diabetes     GERD (gastroesophageal reflux disease)     Hyperlipidemia     Hypertension     PATRICIA (obstructive sleep apnea)      Past Surgical History:   Procedure Laterality Date    HERNIA REPAIR      MYRINGOTOMY W/ TUBES       Family History     Problem Relation (Age of Onset)    Cancer Father    Colon cancer Mother    Coronary artery disease Father    Diabetes Mother, Father        Tobacco Use    Smoking status: Never Smoker    Smokeless tobacco: Not on file   Substance and Sexual Activity    Alcohol use: Not Currently    Drug use: Not on file    Sexual activity: Not on file     Review of Systems   Constitutional: Negative for appetite change, chills, fatigue and fever.   HENT: Negative for trouble swallowing.    Respiratory: Positive for shortness of breath. Negative for cough, choking and chest tightness.    Cardiovascular: Negative for chest pain and palpitations.   Gastrointestinal: Positive for abdominal distention, abdominal pain, diarrhea, nausea and vomiting. Negative for constipation.   Genitourinary: Negative for dysuria, enuresis, flank pain and frequency.   Musculoskeletal: Negative for arthralgias, back pain and myalgias.   Neurological: Negative for dizziness, tremors, seizures, syncope, weakness, light-headedness and numbness.   Psychiatric/Behavioral: Negative for agitation, confusion and self-injury. The patient is not nervous/anxious.    All other systems reviewed and are negative.    Objective:     Vital Signs (Most Recent):  Temp: 98.4 °F (36.9  °C) (02/03/22 1757)  Pulse: 66 (02/03/22 1757)  Resp: 18 (02/03/22 1757)  BP: (!) 141/78 (02/03/22 1757)  SpO2: 95 % (02/03/22 1757) Vital Signs (24h Range):  Temp:  [98 °F (36.7 °C)-98.4 °F (36.9 °C)] 98.4 °F (36.9 °C)  Pulse:  [62-76] 66  Resp:  [16-18] 18  SpO2:  [95 %-100 %] 95 %  BP: (125-141)/(60-81) 141/78     Weight: 117.9 kg (260 lb)  Body mass index is 37.31 kg/m².    Physical Exam  Vitals and nursing note reviewed.   Constitutional:       General: He is not in acute distress.     Appearance: He is obese. He is ill-appearing. He is not toxic-appearing.   HENT:      Head: Normocephalic and atraumatic.      Nose: Nose normal.      Mouth/Throat:      Mouth: Mucous membranes are dry.      Pharynx: Oropharynx is clear.      Comments: Poor dentition  Eyes:      General: No scleral icterus.     Extraocular Movements: Extraocular movements intact.      Conjunctiva/sclera: Conjunctivae normal.      Pupils: Pupils are equal, round, and reactive to light.   Cardiovascular:      Rate and Rhythm: Normal rate and regular rhythm.      Pulses: Normal pulses.      Heart sounds: Normal heart sounds. No murmur heard.  No gallop.    Pulmonary:      Effort: Pulmonary effort is normal. No respiratory distress.      Breath sounds: Normal breath sounds. No stridor. No wheezing or rhonchi.   Abdominal:      General: Abdomen is flat. There is distension.      Palpations: Abdomen is soft.      Tenderness: There is abdominal tenderness (mild diffuse). There is no guarding or rebound.      Hernia: No hernia is present.      Comments: Hyperactive BS   Musculoskeletal:         General: No swelling or deformity. Normal range of motion.      Cervical back: Normal range of motion and neck supple.   Skin:     General: Skin is warm and dry.   Neurological:      Mental Status: He is alert and oriented to person, place, and time. Mental status is at baseline.      Motor: No weakness.   Psychiatric:         Mood and Affect: Mood normal.          Behavior: Behavior normal.         Thought Content: Thought content normal.         Judgment: Judgment normal.         Significant Labs:  I have reviewed all pertinent lab results within the past 24 hours.  CBC:   Recent Labs   Lab 02/03/22  1447   WBC 3.67*   RBC 4.44*   HGB 13.7*   HCT 40.1      MCV 90   MCH 30.9   MCHC 34.2     BMP:   Recent Labs   Lab 02/03/22  1447   *      K 3.6      CO2 29   BUN 24*   CREATININE 1.1   CALCIUM 8.9     CMP:   Recent Labs   Lab 02/03/22  1447   *   CALCIUM 8.9   ALBUMIN 3.6   PROT 7.3      K 3.6   CO2 29      BUN 24*   CREATININE 1.1   ALKPHOS 67   ALT 23   AST 17   BILITOT 0.6       Significant Diagnostics:  I have reviewed all pertinent imaging results/findings within the past 24 hours.  CT: I have reviewed all pertinent results/findings within the past 24 hours and my personal findings are:  SBO    Assessment/Plan:     Small bowel obstruction  Unsure if enteritis/ileus with partial obstruction.  Similar to 2019  NGT decompression  Hydration  Recheck x-ray in AM      VTE Risk Mitigation (From admission, onward)         Ordered     IP VTE HIGH RISK PATIENT  Once         02/03/22 1753     Place sequential compression device  Until discontinued         02/03/22 1753                Thank you for your consult. I will follow-up with patient. Please contact us if you have any additional questions.    Anastasiia Walters MD  General Surgery  [facility]

## 2022-02-04 NOTE — ASSESSMENT & PLAN NOTE
stable    BP Readings from Last 3 Encounters:   02/04/22 115/67   02/01/22 124/72   01/10/22 124/72

## 2022-02-04 NOTE — HPI
Few days of abdominal pain that started as nausea and vomiting with diarrhea.  Family members were also sick at home.  Took Immodium for diarrhea and continued to have pain and nausea/vomiting.  Last vomitus was last night.  Pain continues and feels similar to hospitalization after hernia repair.  Reports some flatus this afternoon.  Last BM was this AM.  Persistent nausea.  No fevers or chills.  No constipation prior.  Some bloating.  Indigestion and reflux.  No chills.

## 2022-02-04 NOTE — PLAN OF CARE
[facility]  Initial Discharge Assessment       Primary Care Provider: Dustin Juarez Jr, MD    Admission Diagnosis: Gastroenteritis [K52.9]  SBO (small bowel obstruction) [K56.609]  Encounter for nasogastric (NG) tube placement [Z46.59]    Admission Date: 2/3/2022  Expected Discharge Date:     Discharge Barriers Identified: None    Payor: [employer]ARE / Plan: "[employer] SECURE HEALTH / Product Type: Medicare Advantage /     Extended Emergency Contact Information  Primary Emergency Contact: Roxanne Resendez  Mobile Phone: [phone]  Relation: Spouse  Secondary Emergency Contact: CHIOMALATISHAERNESTO  Mobile Phone: [phone]  Relation: Daughter  Preferred language: English   needed? No    Discharge Plan A: Home  Discharge Plan B: Home      [facility] - [place], [place] - [address]  [address]  [facility] [address]  Phone: [phone] Fax: [phone]      Initial Assessment (most recent)     Adult Discharge Assessment - 02/04/22 0809        Discharge Assessment    Assessment Type Discharge Planning Assessment     Confirmed/corrected address, phone number and insurance Yes     Confirmed Demographics --   CM updated emergency contacts on facesheet    Source of Information patient;family     When was your last doctors appointment? --   No value- sometime in December 2021.    Communicated D[place] with patient/caregiver Date not available/Unable to determine     Lives With spouse     Facility Arrived From: Home     Do you expect to return to your current living situation? Yes     Do you have help at home or someone to help you manage your care at home? --   Pt does not need a caregiver but has support from wife Roxanne if needed.    Prior to hospitilization cognitive status: Alert/Oriented     Current cognitive status: Alert/Oriented     Walking or Climbing Stairs Difficulty none     Dressing/Bathing Difficulty none     Home Accessibility  wheelchair accessible     Home Layout Able to live on 1st floor     Equipment Currently Used at Home none     Readmission within 30 days? No     Patient currently being followed by outpatient case management? No     Do you currently have service(s) that help you manage your care at home? No     Do you take prescription medications? Yes     Do you have any problems affording any of your prescribed medications? No     Is the patient taking medications as prescribed? no   Pt noted that he sometimes forgets to take all medications.    Who is going to help you get home at discharge? Roxanne Resendez (spouse)     How do you get to doctors appointments? car, drives self     Are you on dialysis? No     Do you take coumadin? No     Discharge Plan A Home     Discharge Plan B Home     DME Needed Upon Discharge  none     Discharge Plan discussed with: Spouse/sig other;Patient     Name(s) and Number(s) Roxanne Resendez [phone]     Discharge Barriers Identified None               CM discharge assessment complete with pt and spouse Roxanne. Pt awake, alert and oriented during the assessment. Prior to hospital admission, pt was independent with ambulation and ADLs. Pt lives with his spouse Roxanne and his sister Lauryn who they care for. Pt noted that he sees Dr. Juarez every 6 months in clinic and noted that he sometimes forgets to take all of his medications at times. CM discussed the importance of taking medications as prescribed and setting an alarm on his phone to help him remember. Pt's emergency contact information was updated on facesheet. No services or DME anticipated at the time of discharge. CM department will continue to follow and assist as needed.

## 2022-02-04 NOTE — SUBJECTIVE & OBJECTIVE
No current facility-administered medications on file prior to encounter.     Current Outpatient Medications on File Prior to Encounter   Medication Sig    ALPRAZolam (XANAX) 0.25 MG tablet Take 1 tablet (0.25 mg total) by mouth daily as needed for Anxiety.    cholecalciferol, vitamin D3, 10 mcg (400 unit) Cap Take 400 Units by mouth once daily.    clobetasol 0.05% (TEMOVATE) 0.05 % Oint Apply topically 2 (two) times daily.    ezetimibe (ZETIA) 10 mg tablet TAKE 1 TABLET BY MOUTH DAILY    FLUoxetine 10 MG capsule Take 1 capsule (10 mg total) by mouth once daily.    lisinopriL 10 MG tablet TAKE 1 TABLET BY MOUTH DAILY    metFORMIN (GLUCOPHAGE) 1000 MG tablet TAKE 1 TABLET BY MOUTH TWICE DAILY    metoprolol tartrate (LOPRESSOR) 25 MG tablet TAKE 1 TABLET BY MOUTH DAILY    ondansetron (ZOFRAN-ODT) 4 MG TbDL Take 1 tablet (4 mg total) by mouth every 6 (six) hours as needed (nausea and vomiting).    rosuvastatin (CRESTOR) 20 MG tablet TAKE 1 TABLET BY MOUTH DAILY    sildenafil (REVATIO) 20 mg Tab Take 1 tablet (20 mg total) by mouth daily as needed (erectil dysfunction).       Review of patient's allergies indicates:   Allergen Reactions    Codeine        Past Medical History:   Diagnosis Date    Arthritis     Diabetes     GERD (gastroesophageal reflux disease)     Hyperlipidemia     Hypertension     PATRICIA (obstructive sleep apnea)      Past Surgical History:   Procedure Laterality Date    HERNIA REPAIR      MYRINGOTOMY W/ TUBES       Family History     Problem Relation (Age of Onset)    Cancer Father    Colon cancer Mother    Coronary artery disease Father    Diabetes Mother, Father        Tobacco Use    Smoking status: Never Smoker    Smokeless tobacco: Not on file   Substance and Sexual Activity    Alcohol use: Not Currently    Drug use: Not on file    Sexual activity: Not on file     Review of Systems   Constitutional: Negative for appetite change, chills, fatigue and fever.   HENT: Negative for  trouble swallowing.    Respiratory: Positive for shortness of breath. Negative for cough, choking and chest tightness.    Cardiovascular: Negative for chest pain and palpitations.   Gastrointestinal: Positive for abdominal distention, abdominal pain, diarrhea, nausea and vomiting. Negative for constipation.   Genitourinary: Negative for dysuria, enuresis, flank pain and frequency.   Musculoskeletal: Negative for arthralgias, back pain and myalgias.   Neurological: Negative for dizziness, tremors, seizures, syncope, weakness, light-headedness and numbness.   Psychiatric/Behavioral: Negative for agitation, confusion and self-injury. The patient is not nervous/anxious.    All other systems reviewed and are negative.    Objective:     Vital Signs (Most Recent):  Temp: 98.4 °F (36.9 °C) (02/03/22 1757)  Pulse: 66 (02/03/22 1757)  Resp: 18 (02/03/22 1757)  BP: (!) 141/78 (02/03/22 1757)  SpO2: 95 % (02/03/22 1757) Vital Signs (24h Range):  Temp:  [98 °F (36.7 °C)-98.4 °F (36.9 °C)] 98.4 °F (36.9 °C)  Pulse:  [62-76] 66  Resp:  [16-18] 18  SpO2:  [95 %-100 %] 95 %  BP: (125-141)/(60-81) 141/78     Weight: 117.9 kg (260 lb)  Body mass index is 37.31 kg/m².    Physical Exam  Vitals and nursing note reviewed.   Constitutional:       General: He is not in acute distress.     Appearance: He is obese. He is ill-appearing. He is not toxic-appearing.   HENT:      Head: Normocephalic and atraumatic.      Nose: Nose normal.      Mouth/Throat:      Mouth: Mucous membranes are dry.      Pharynx: Oropharynx is clear.      Comments: Poor dentition  Eyes:      General: No scleral icterus.     Extraocular Movements: Extraocular movements intact.      Conjunctiva/sclera: Conjunctivae normal.      Pupils: Pupils are equal, round, and reactive to light.   Cardiovascular:      Rate and Rhythm: Normal rate and regular rhythm.      Pulses: Normal pulses.      Heart sounds: Normal heart sounds. No murmur heard.  No gallop.    Pulmonary:       Effort: Pulmonary effort is normal. No respiratory distress.      Breath sounds: Normal breath sounds. No stridor. No wheezing or rhonchi.   Abdominal:      General: Abdomen is flat. There is distension.      Palpations: Abdomen is soft.      Tenderness: There is abdominal tenderness (mild diffuse). There is no guarding or rebound.      Hernia: No hernia is present.      Comments: Hyperactive BS   Musculoskeletal:         General: No swelling or deformity. Normal range of motion.      Cervical back: Normal range of motion and neck supple.   Skin:     General: Skin is warm and dry.   Neurological:      Mental Status: He is alert and oriented to person, place, and time. Mental status is at baseline.      Motor: No weakness.   Psychiatric:         Mood and Affect: Mood normal.         Behavior: Behavior normal.         Thought Content: Thought content normal.         Judgment: Judgment normal.         Significant Labs:  I have reviewed all pertinent lab results within the past 24 hours.  CBC:   Recent Labs   Lab 02/03/22  1447   WBC 3.67*   RBC 4.44*   HGB 13.7*   HCT 40.1      MCV 90   MCH 30.9   MCHC 34.2     BMP:   Recent Labs   Lab 02/03/22  1447   *      K 3.6      CO2 29   BUN 24*   CREATININE 1.1   CALCIUM 8.9     CMP:   Recent Labs   Lab 02/03/22  1447   *   CALCIUM 8.9   ALBUMIN 3.6   PROT 7.3      K 3.6   CO2 29      BUN 24*   CREATININE 1.1   ALKPHOS 67   ALT 23   AST 17   BILITOT 0.6       Significant Diagnostics:  I have reviewed all pertinent imaging results/findings within the past 24 hours.  CT: I have reviewed all pertinent results/findings within the past 24 hours and my personal findings are:  SBO

## 2022-02-05 LAB
ALBUMIN SERPL BCP-MCNC: 2.8 G/DL (ref 3.5–5.2)
ALP SERPL-CCNC: 58 U/L (ref 55–135)
ALT SERPL W/O P-5'-P-CCNC: 18 U/L (ref 10–44)
ANION GAP SERPL CALC-SCNC: 5 MMOL/L (ref 8–16)
AST SERPL-CCNC: 14 U/L (ref 10–40)
BASOPHILS # BLD AUTO: 0.03 K/UL (ref 0–0.2)
BASOPHILS NFR BLD: 0.7 % (ref 0–1.9)
BILIRUB SERPL-MCNC: 0.5 MG/DL (ref 0.1–1)
BUN SERPL-MCNC: 12 MG/DL (ref 8–23)
CALCIUM SERPL-MCNC: 8.2 MG/DL (ref 8.7–10.5)
CHLORIDE SERPL-SCNC: 109 MMOL/L (ref 95–110)
CO2 SERPL-SCNC: 28 MMOL/L (ref 23–29)
CREAT SERPL-MCNC: 0.9 MG/DL (ref 0.5–1.4)
DIFFERENTIAL METHOD: ABNORMAL
EOSINOPHIL # BLD AUTO: 0.1 K/UL (ref 0–0.5)
EOSINOPHIL NFR BLD: 2.7 % (ref 0–8)
ERYTHROCYTE [DISTWIDTH] IN BLOOD BY AUTOMATED COUNT: 11.8 % (ref 11.5–14.5)
EST. GFR  (AFRICAN AMERICAN): >60 ML/MIN/1.73 M^2
EST. GFR  (NON AFRICAN AMERICAN): >60 ML/MIN/1.73 M^2
GLUCOSE SERPL-MCNC: 116 MG/DL (ref 70–110)
HCT VFR BLD AUTO: 36.6 % (ref 40–54)
HGB BLD-MCNC: 12.5 G/DL (ref 14–18)
IMM GRANULOCYTES # BLD AUTO: 0 K/UL (ref 0–0.04)
IMM GRANULOCYTES NFR BLD AUTO: 0 % (ref 0–0.5)
LYMPHOCYTES # BLD AUTO: 1.2 K/UL (ref 1–4.8)
LYMPHOCYTES NFR BLD: 26.8 % (ref 18–48)
MAGNESIUM SERPL-MCNC: 1.8 MG/DL (ref 1.6–2.6)
MCH RBC QN AUTO: 30.7 PG (ref 27–31)
MCHC RBC AUTO-ENTMCNC: 34.2 G/DL (ref 32–36)
MCV RBC AUTO: 90 FL (ref 82–98)
MONOCYTES # BLD AUTO: 0.9 K/UL (ref 0.3–1)
MONOCYTES NFR BLD: 20.8 % (ref 4–15)
NEUTROPHILS # BLD AUTO: 2.2 K/UL (ref 1.8–7.7)
NEUTROPHILS NFR BLD: 49 % (ref 38–73)
NRBC BLD-RTO: 0 /100 WBC
PLATELET # BLD AUTO: 150 K/UL (ref 150–450)
PMV BLD AUTO: 10.3 FL (ref 9.2–12.9)
POCT GLUCOSE: 119 MG/DL (ref 70–110)
POCT GLUCOSE: 124 MG/DL (ref 70–110)
POCT GLUCOSE: 132 MG/DL (ref 70–110)
POCT GLUCOSE: 138 MG/DL (ref 70–110)
POCT GLUCOSE: 153 MG/DL (ref 70–110)
POTASSIUM SERPL-SCNC: 3.7 MMOL/L (ref 3.5–5.1)
PROT SERPL-MCNC: 6.3 G/DL (ref 6–8.4)
RBC # BLD AUTO: 4.07 M/UL (ref 4.6–6.2)
SODIUM SERPL-SCNC: 142 MMOL/L (ref 136–145)
WBC # BLD AUTO: 4.52 K/UL (ref 3.9–12.7)

## 2022-02-05 PROCEDURE — 85025 COMPLETE CBC W/AUTO DIFF WBC: CPT | Performed by: INTERNAL MEDICINE

## 2022-02-05 PROCEDURE — 63600175 PHARM REV CODE 636 W HCPCS: Performed by: INTERNAL MEDICINE

## 2022-02-05 PROCEDURE — 36415 COLL VENOUS BLD VENIPUNCTURE: CPT | Performed by: INTERNAL MEDICINE

## 2022-02-05 PROCEDURE — 83735 ASSAY OF MAGNESIUM: CPT | Performed by: INTERNAL MEDICINE

## 2022-02-05 PROCEDURE — 25000003 PHARM REV CODE 250: Performed by: SURGERY

## 2022-02-05 PROCEDURE — 11000001 HC ACUTE MED/SURG PRIVATE ROOM

## 2022-02-05 PROCEDURE — 25000003 PHARM REV CODE 250: Performed by: INTERNAL MEDICINE

## 2022-02-05 PROCEDURE — 80053 COMPREHEN METABOLIC PANEL: CPT | Performed by: INTERNAL MEDICINE

## 2022-02-05 RX ORDER — DOCUSATE SODIUM 100 MG/1
100 CAPSULE, LIQUID FILLED ORAL 2 TIMES DAILY
Status: CANCELLED | OUTPATIENT
Start: 2022-02-05

## 2022-02-05 RX ADMIN — ONDANSETRON HYDROCHLORIDE 4 MG: 2 SOLUTION INTRAMUSCULAR; INTRAVENOUS at 06:02

## 2022-02-05 RX ADMIN — FAMOTIDINE 20 MG: 10 INJECTION INTRAVENOUS at 08:02

## 2022-02-05 RX ADMIN — ACETAMINOPHEN 650 MG: 325 TABLET ORAL at 11:02

## 2022-02-05 RX ADMIN — ATORVASTATIN CALCIUM 20 MG: 20 TABLET, FILM COATED ORAL at 08:02

## 2022-02-05 RX ADMIN — BISACODYL 10 MG: 10 SUPPOSITORY RECTAL at 08:02

## 2022-02-05 RX ADMIN — MORPHINE SULFATE 2 MG: 2 INJECTION, SOLUTION INTRAMUSCULAR; INTRAVENOUS at 07:02

## 2022-02-05 RX ADMIN — EZETIMIBE 10 MG: 10 TABLET ORAL at 08:02

## 2022-02-05 NOTE — ASSESSMENT & PLAN NOTE
Unsure if enteritis/ileus with partial obstruction.  Similar to 2019.  Clinically more of ileus  NGT clamped and tolerating liquids.  DC NGT  Hydration  Advance diet.  OK to DC in AM if tolerates diet and will continue workup outpatient.

## 2022-02-05 NOTE — HOSPITAL COURSE
2/5/22 [facility] patient is sitting up in the chair. NGT was discontinued yesterday and tolerated breakfast this am. He reports passing a lot of gas, 2 BM on yesterday. Will monitor and likely discharge home tomorrow with outpatient follow up with general surgery    2/6/22 [facility] patient's wife at bedside. Patient reports abdominal pain and vomiting last night after dinner. Wife reports vomiting a lot. He did get up and ambulate with assistance and reports passing lots of gas per rectum. Did have BM small on yesterday. Continue current diet and monitor.   2/7: The patient is tolerating by mouth and is having flatus.  Will discharge with close outpatient follow-up.

## 2022-02-05 NOTE — ASSESSMENT & PLAN NOTE
Last A1c reviewed-   Lab Results   Component Value Date    HGBA1C 6.2 (H) 02/04/2022     Most recent fingerstick glucose reviewed-   Recent Labs   Lab 02/05/22  0216 02/05/22  0558   POCTGLUCOSE 119* 132*     Current correctional scale:  Low  Anti-hyperglycemic dose as follows-   Antihyperglycemics (From admission, onward)            Start     Stop Route Frequency Ordered    02/04/22 1143  insulin aspart U-100 pen 0-5 Units         -- SubQ Every 6 hours PRN 02/04/22 1043        Hold Oral hypoglycemics while patient is in the hospital.

## 2022-02-05 NOTE — PROGRESS NOTES
[facility] Medicine  Progress Note    Patient Name: Luc Resendez  MRN: [id number]  Patient Class: IP- Inpatient   Admission Date: 2/3/2022  Length of Stay: 2 days  Attending Physician: Dustin Juarez Jr., MD  Primary Care Provider: Dustin Juarez Jr, MD        Subjective:     Principal Problem:Small bowel obstruction        HPI:  Chief Complaint   Patient presents with    Vomiting    Diarrhea       Pt stated that he has been experiencing abdominal cramping with n/v/d since Monday. Seen by PCP on Tuesday and Rx zofran - pt stating that symptoms are not improving.       66-year-old male with history of diabetes, hypertension, high cholesterol, acid reflux presents with diffuse abdominal pain which she describes as cramping since Monday.  Patient says the pain is episodic with associated nonbloody nonbilious vomiting and nonbloody diarrhea.  Patient denies any travel, antibiotic use, fever, trauma, dysuria.  Patient does have history of hernia and constipate            Overview/Hospital Course:  2/5/22 [facility] patient is sitting up in the chair. NGT was discontinued yesterday and tolerated breakfast this am. He reports passing a lot of gas, 2 BM on yesterday. Will monitor and likely discharge home tomorrow with outpatient follow up with general surgery      Past Medical History:   Diagnosis Date    Arthritis     Diabetes     GERD (gastroesophageal reflux disease)     Hyperlipidemia     Hypertension     PATRICIA (obstructive sleep apnea)        Past Surgical History:   Procedure Laterality Date    HERNIA REPAIR      MYRINGOTOMY W/ TUBES         Review of patient's allergies indicates:   Allergen Reactions    Codeine        No current facility-administered medications on file prior to encounter.     Current Outpatient Medications on File Prior to Encounter   Medication Sig    cholecalciferol, vitamin D3, 10 mcg (400 unit) Cap Take 400 Units by mouth once daily.    clobetasol 0.05% (TEMOVATE)  0.05 % Oint Apply topically 2 (two) times daily.    ezetimibe (ZETIA) 10 mg tablet TAKE 1 TABLET BY MOUTH DAILY    FLUoxetine 10 MG capsule Take 1 capsule (10 mg total) by mouth once daily.    lisinopriL 10 MG tablet TAKE 1 TABLET BY MOUTH DAILY    metFORMIN (GLUCOPHAGE) 1000 MG tablet TAKE 1 TABLET BY MOUTH TWICE DAILY    metoprolol tartrate (LOPRESSOR) 25 MG tablet TAKE 1 TABLET BY MOUTH DAILY    ondansetron (ZOFRAN-ODT) 4 MG TbDL Take 1 tablet (4 mg total) by mouth every 6 (six) hours as needed (nausea and vomiting).    rosuvastatin (CRESTOR) 20 MG tablet TAKE 1 TABLET BY MOUTH DAILY    sildenafil (REVATIO) 20 mg Tab Take 1 tablet (20 mg total) by mouth daily as needed (erectil dysfunction).    ALPRAZolam (XANAX) 0.25 MG tablet Take 1 tablet (0.25 mg total) by mouth daily as needed for Anxiety.     Family History     Problem Relation (Age of Onset)    Cancer Father    Colon cancer Mother    Coronary artery disease Father    Diabetes Mother, Father        Tobacco Use    Smoking status: Never Smoker    Smokeless tobacco: Never Used   Substance and Sexual Activity    Alcohol use: Yes     Alcohol/week: 2.0 standard drinks     Types: 2 Glasses of wine per week    Drug use: Never    Sexual activity: Not on file     Review of Systems   Constitutional: Negative for chills and fever.   HENT: Negative for rhinorrhea, sneezing and sore throat.    Eyes: Negative for pain and visual disturbance.   Respiratory: Negative for cough and shortness of breath.    Cardiovascular: Negative for chest pain.   Gastrointestinal: Positive for abdominal distention. Negative for abdominal pain, constipation and diarrhea.   Endocrine: Negative for cold intolerance and heat intolerance.   Genitourinary: Negative for difficulty urinating.   Musculoskeletal: Negative for arthralgias and joint swelling.   Skin: Negative for rash.   Allergic/Immunologic: Negative for environmental allergies.   Neurological: Negative for dizziness,  syncope and weakness.   Hematological: Does not bruise/bleed easily.   Psychiatric/Behavioral: Negative for dysphoric mood. The patient is not nervous/anxious.      Objective:     Vital Signs (Most Recent):  Temp: 98 °F (36.7 °C) (02/05/22 0717)  Pulse: (!) 55 (02/05/22 0717)  Resp: 18 (02/05/22 0717)  BP: 132/61 (02/05/22 0717)  SpO2: (!) 93 % (02/05/22 0717) Vital Signs (24h Range):  Temp:  [97 °F (36.1 °C)-98 °F (36.7 °C)] 98 °F (36.7 °C)  Pulse:  [55-60] 55  Resp:  [18-20] 18  SpO2:  [93 %-97 %] 93 %  BP: (117-137)/(61-85) 132/61     Weight: 117.9 kg (260 lb)  Body mass index is 37.31 kg/m².    Physical Exam  Vitals and nursing note reviewed.   Constitutional:       Appearance: Normal appearance.   HENT:      Head: Normocephalic and atraumatic.      Nose: Nose normal.   Eyes:      Extraocular Movements: Extraocular movements intact.      Pupils: Pupils are equal, round, and reactive to light.   Cardiovascular:      Rate and Rhythm: Normal rate and regular rhythm.      Heart sounds: No murmur heard.  No friction rub. No gallop.    Pulmonary:      Effort: Pulmonary effort is normal.      Breath sounds: Normal breath sounds.   Abdominal:      General: Abdomen is flat. There is distension.      Palpations: Abdomen is soft.      Tenderness: There is no abdominal tenderness.      Comments: Hypoactive BS   Musculoskeletal:         General: No swelling or deformity.      Cervical back: Normal range of motion and neck supple.   Skin:     General: Skin is warm and dry.      Capillary Refill: Capillary refill takes less than 2 seconds.   Neurological:      General: No focal deficit present.      Mental Status: He is alert and oriented to person, place, and time.   Psychiatric:         Mood and Affect: Mood normal.         Behavior: Behavior normal.           CRANIAL NERVES     CN III, IV, VI   Pupils are equal, round, and reactive to light.       Significant Labs:   All pertinent labs within the past 24 hours have been  reviewed.  CBC:   Recent Labs   Lab 02/03/22  1447 02/04/22  0427 02/05/22  0357   WBC 3.67* 3.67* 4.52   HGB 13.7* 12.5* 12.5*   HCT 40.1 37.3* 36.6*    160 150     CMP:   Recent Labs   Lab 02/03/22  1447 02/04/22  0427 02/05/22  0357    141 142   K 3.6 3.9 3.7    108 109   CO2 29 30* 28   * 100 116*   BUN 24* 17 12   CREATININE 1.1 1.0 0.9   CALCIUM 8.9 8.4* 8.2*   PROT 7.3 6.5 6.3   ALBUMIN 3.6 3.1* 2.8*   BILITOT 0.6 0.5 0.5   ALKPHOS 67 60 58   AST 17 15 14   ALT 23 19 18   ANIONGAP 6* 3* 5*   EGFRNONAA >60.0 >60.0 >60.0     Magnesium:   Recent Labs   Lab 02/05/22  0357   MG 1.8     POCT Glucose:   Recent Labs   Lab 02/05/22  0216 02/05/22  0558   POCTGLUCOSE 119* 132*       Significant Imaging: I have reviewed all pertinent imaging results/findings within the past 24 hours.      Assessment/Plan:      * Small bowel obstruction  GS following.  NGT placed.  Now passing flatus.      Continue treatment per GS recs    2/5/22 [facility] NGT has been discontinued, tolerating diet x 1 meal--passing gas and BM x2 will monitor next 24 hours and likely discharge home tomorrow with outpatient follow up      Type II diabetes mellitus  Last A1c reviewed-   Lab Results   Component Value Date    HGBA1C 6.2 (H) 02/04/2022     Most recent fingerstick glucose reviewed-   Recent Labs   Lab 02/05/22  0216 02/05/22  0558   POCTGLUCOSE 119* 132*     Current correctional scale:  Low  Anti-hyperglycemic dose as follows-   Antihyperglycemics (From admission, onward)            Start     Stop Route Frequency Ordered    02/04/22 1143  insulin aspart U-100 pen 0-5 Units         -- SubQ Every 6 hours PRN 02/04/22 1043        Hold Oral hypoglycemics while patient is in the hospital.        GERD (gastroesophageal reflux disease)  Continue PPI      Hyperlipidemia  Restart statin when stable    Hypertension  stable    BP Readings from Last 3 Encounters:   02/05/22 132/61   02/01/22 124/72   01/10/22 124/72            Obesity  Body mass index is 37.31 kg/m². Morbid obesity complicates all aspects of disease management from diagnostic modalities to treatment. Weight loss encouraged and health benefits explained to patient.           VTE Risk Mitigation (From admission, onward)         Ordered     IP VTE HIGH RISK PATIENT  Once         02/03/22 1753     Place sequential compression device  Until discontinued         02/03/22 1753                Discharge Planning   DAKOTA:      Code Status: Full Code   Is the patient medically ready for discharge?:     Reason for patient still in hospital (select all that apply): Patient trending condition, Laboratory test, Treatment and PT / OT recommendations  Discharge Plan A: Home                  Elisa Temple NP  Department of Hospital Medicine   [facility] S[facility]

## 2022-02-05 NOTE — PROGRESS NOTES
[facility] Surg  General Surgery  Progress Note  2/4/22    Subjective:     History of Present Illness:  Few days of abdominal pain that started as nausea and vomiting with diarrhea.  Family members were also sick at home.  Took Immodium for diarrhea and continued to have pain and nausea/vomiting.  Last vomitus was last night.  Pain continues and feels similar to hospitalization after hernia repair.  Reports some flatus this afternoon.  Last BM was this AM.  Persistent nausea.  No fevers or chills.  No constipation prior.  Some bloating.  Indigestion and reflux.  No chills.        Post-Op Info:  * No surgery found *         Interval History: No new issues.  Tolerating clears.  No nausea.  Fairly normal BM this afternoon.      Medications:  Continuous Infusions:   dextrose 5 % and 0.45 % NaCl 75 mL/hr at 02/04/22 1035     Scheduled Meds:   atorvastatin  20 mg Oral Daily    bisacodyL  10 mg Rectal Daily    ezetimibe  10 mg Oral Daily    famotidine (PF)  20 mg Intravenous Q12H    metoprolol tartrate  25 mg Oral Daily     PRN Meds:acetaminophen, dextrose 50%, glucagon (human recombinant), insulin aspart U-100, melatonin, morphine, ondansetron, sodium chloride 0.9%     Review of patient's allergies indicates:   Allergen Reactions    Codeine      Objective:     Vital Signs (Most Recent):  Temp: 97.8 °F (36.6 °C) (02/04/22 2002)  Pulse: (!) 58 (02/04/22 2002)  Resp: 18 (02/04/22 2002)  BP: 137/69 (02/04/22 2002)  SpO2: 95 % (02/04/22 2002) Vital Signs (24h Range):  Temp:  [97 °F (36.1 °C)-99.3 °F (37.4 °C)] 97.8 °F (36.6 °C)  Pulse:  [56-63] 58  Resp:  [18-20] 18  SpO2:  [91 %-97 %] 95 %  BP: (108-137)/(55-85) 137/69     Weight: 117.9 kg (260 lb)  Body mass index is 37.31 kg/m².    Intake/Output - Last 3 Shifts       02/02 0700 02/03 0659 02/03 0700 02/04 0659 02/04 0700 02/05 0659    IV Piggyback  1000     Total Intake(mL/kg)  1000 (8.5)     Urine (mL/kg/hr)  0     Drains  50     Total Output  50     Net   +950            Urine Occurrence  1 x           Physical Exam  Vitals and nursing note reviewed.   Constitutional:       General: He is not in acute distress.     Appearance: He is obese. He is not ill-appearing or toxic-appearing.   HENT:      Head: Normocephalic and atraumatic.      Nose: Nose normal.      Mouth/Throat:      Mouth: Mucous membranes are dry.      Pharynx: Oropharynx is clear.      Comments: Poor dentition  Eyes:      General: No scleral icterus.     Extraocular Movements: Extraocular movements intact.      Conjunctiva/sclera: Conjunctivae normal.      Pupils: Pupils are equal, round, and reactive to light.   Cardiovascular:      Rate and Rhythm: Normal rate and regular rhythm.      Pulses: Normal pulses.      Heart sounds: Normal heart sounds. No murmur heard.  No gallop.    Pulmonary:      Effort: Pulmonary effort is normal. No respiratory distress.      Breath sounds: Normal breath sounds. No stridor. No wheezing or rhonchi.   Abdominal:      General: Abdomen is flat. Bowel sounds are normal. There is distension (minimal).      Palpations: Abdomen is soft.      Tenderness: There is no abdominal tenderness. There is no guarding or rebound.      Hernia: No hernia is present.   Musculoskeletal:         General: No swelling or deformity. Normal range of motion.      Cervical back: Normal range of motion and neck supple.   Skin:     General: Skin is warm and dry.   Neurological:      Mental Status: He is alert and oriented to person, place, and time. Mental status is at baseline.      Motor: No weakness.   Psychiatric:         Mood and Affect: Mood normal.         Behavior: Behavior normal.         Thought Content: Thought content normal.         Judgment: Judgment normal.         Significant Labs:  I have reviewed all pertinent lab results within the past 24 hours.  CBC:   Recent Labs   Lab 02/04/22  0427   WBC 3.67*   RBC 4.13*   HGB 12.5*   HCT 37.3*      MCV 90   MCH 30.3   MCHC 33.5      BMP:   Recent Labs   Lab 02/04/22  0427         K 3.9      CO2 30*   BUN 17   CREATININE 1.0   CALCIUM 8.4*     CMP:   Recent Labs   Lab 02/04/22  0427      CALCIUM 8.4*   ALBUMIN 3.1*   PROT 6.5      K 3.9   CO2 30*      BUN 17   CREATININE 1.0   ALKPHOS 60   ALT 19   AST 15   BILITOT 0.5       Significant Diagnostics:  I have reviewed all pertinent imaging results/findings within the past 24 hours.    Assessment/Plan:     * Small bowel obstruction  Unsure if enteritis/ileus with partial obstruction.  Similar to 2019.  Clinically more of ileus  NGT clamped and tolerating liquids.  DC NGT  Hydration  Advance diet.  OK to DC in AM if tolerates diet and will continue workup outpatient.          Anastasiia Walters MD  General Surgery  [facility]

## 2022-02-05 NOTE — ASSESSMENT & PLAN NOTE
GS following.  NGT placed.  Now passing flatus.      Continue treatment per GS recs    2/5/22 [facility] NGT has been discontinued, tolerating diet x 1 meal--passing gas and BM x2 will monitor next 24 hours and likely discharge home tomorrow with outpatient follow up

## 2022-02-05 NOTE — ASSESSMENT & PLAN NOTE
stable    BP Readings from Last 3 Encounters:   02/05/22 132/61   02/01/22 124/72   01/10/22 124/72

## 2022-02-05 NOTE — SUBJECTIVE & OBJECTIVE
Interval History: No new issues.  Tolerating clears.  No nausea.  Fairly normal BM this afternoon.      Medications:  Continuous Infusions:   dextrose 5 % and 0.45 % NaCl 75 mL/hr at 02/04/22 1035     Scheduled Meds:   atorvastatin  20 mg Oral Daily    bisacodyL  10 mg Rectal Daily    ezetimibe  10 mg Oral Daily    famotidine (PF)  20 mg Intravenous Q12H    metoprolol tartrate  25 mg Oral Daily     PRN Meds:acetaminophen, dextrose 50%, glucagon (human recombinant), insulin aspart U-100, melatonin, morphine, ondansetron, sodium chloride 0.9%     Review of patient's allergies indicates:   Allergen Reactions    Codeine      Objective:     Vital Signs (Most Recent):  Temp: 97.8 °F (36.6 °C) (02/04/22 2002)  Pulse: (!) 58 (02/04/22 2002)  Resp: 18 (02/04/22 2002)  BP: 137/69 (02/04/22 2002)  SpO2: 95 % (02/04/22 2002) Vital Signs (24h Range):  Temp:  [97 °F (36.1 °C)-99.3 °F (37.4 °C)] 97.8 °F (36.6 °C)  Pulse:  [56-63] 58  Resp:  [18-20] 18  SpO2:  [91 %-97 %] 95 %  BP: (108-137)/(55-85) 137/69     Weight: 117.9 kg (260 lb)  Body mass index is 37.31 kg/m².    Intake/Output - Last 3 Shifts       02/02 0700  02/03 0659 02/03 0700  02/04 0659 02/04 0700  02/05 0659    IV Piggyback  1000     Total Intake(mL/kg)  1000 (8.5)     Urine (mL/kg/hr)  0     Drains  50     Total Output  50     Net  +950            Urine Occurrence  1 x           Physical Exam  Vitals and nursing note reviewed.   Constitutional:       General: He is not in acute distress.     Appearance: He is obese. He is not ill-appearing or toxic-appearing.   HENT:      Head: Normocephalic and atraumatic.      Nose: Nose normal.      Mouth/Throat:      Mouth: Mucous membranes are dry.      Pharynx: Oropharynx is clear.      Comments: Poor dentition  Eyes:      General: No scleral icterus.     Extraocular Movements: Extraocular movements intact.      Conjunctiva/sclera: Conjunctivae normal.      Pupils: Pupils are equal, round, and reactive to light.    Cardiovascular:      Rate and Rhythm: Normal rate and regular rhythm.      Pulses: Normal pulses.      Heart sounds: Normal heart sounds. No murmur heard.  No gallop.    Pulmonary:      Effort: Pulmonary effort is normal. No respiratory distress.      Breath sounds: Normal breath sounds. No stridor. No wheezing or rhonchi.   Abdominal:      General: Abdomen is flat. Bowel sounds are normal. There is distension (minimal).      Palpations: Abdomen is soft.      Tenderness: There is no abdominal tenderness. There is no guarding or rebound.      Hernia: No hernia is present.   Musculoskeletal:         General: No swelling or deformity. Normal range of motion.      Cervical back: Normal range of motion and neck supple.   Skin:     General: Skin is warm and dry.   Neurological:      Mental Status: He is alert and oriented to person, place, and time. Mental status is at baseline.      Motor: No weakness.   Psychiatric:         Mood and Affect: Mood normal.         Behavior: Behavior normal.         Thought Content: Thought content normal.         Judgment: Judgment normal.         Significant Labs:  I have reviewed all pertinent lab results within the past 24 hours.  CBC:   Recent Labs   Lab 02/04/22 0427   WBC 3.67*   RBC 4.13*   HGB 12.5*   HCT 37.3*      MCV 90   MCH 30.3   MCHC 33.5     BMP:   Recent Labs   Lab 02/04/22 0427         K 3.9      CO2 30*   BUN 17   CREATININE 1.0   CALCIUM 8.4*     CMP:   Recent Labs   Lab 02/04/22 0427      CALCIUM 8.4*   ALBUMIN 3.1*   PROT 6.5      K 3.9   CO2 30*      BUN 17   CREATININE 1.0   ALKPHOS 60   ALT 19   AST 15   BILITOT 0.5       Significant Diagnostics:  I have reviewed all pertinent imaging results/findings within the past 24 hours.

## 2022-02-05 NOTE — SUBJECTIVE & OBJECTIVE
Past Medical History:   Diagnosis Date    Arthritis     Diabetes     GERD (gastroesophageal reflux disease)     Hyperlipidemia     Hypertension     PATRICIA (obstructive sleep apnea)        Past Surgical History:   Procedure Laterality Date    HERNIA REPAIR      MYRINGOTOMY W/ TUBES         Review of patient's allergies indicates:   Allergen Reactions    Codeine        No current facility-administered medications on file prior to encounter.     Current Outpatient Medications on File Prior to Encounter   Medication Sig    cholecalciferol, vitamin D3, 10 mcg (400 unit) Cap Take 400 Units by mouth once daily.    clobetasol 0.05% (TEMOVATE) 0.05 % Oint Apply topically 2 (two) times daily.    ezetimibe (ZETIA) 10 mg tablet TAKE 1 TABLET BY MOUTH DAILY    FLUoxetine 10 MG capsule Take 1 capsule (10 mg total) by mouth once daily.    lisinopriL 10 MG tablet TAKE 1 TABLET BY MOUTH DAILY    metFORMIN (GLUCOPHAGE) 1000 MG tablet TAKE 1 TABLET BY MOUTH TWICE DAILY    metoprolol tartrate (LOPRESSOR) 25 MG tablet TAKE 1 TABLET BY MOUTH DAILY    ondansetron (ZOFRAN-ODT) 4 MG TbDL Take 1 tablet (4 mg total) by mouth every 6 (six) hours as needed (nausea and vomiting).    rosuvastatin (CRESTOR) 20 MG tablet TAKE 1 TABLET BY MOUTH DAILY    sildenafil (REVATIO) 20 mg Tab Take 1 tablet (20 mg total) by mouth daily as needed (erectil dysfunction).    ALPRAZolam (XANAX) 0.25 MG tablet Take 1 tablet (0.25 mg total) by mouth daily as needed for Anxiety.     Family History     Problem Relation (Age of Onset)    Cancer Father    Colon cancer Mother    Coronary artery disease Father    Diabetes Mother, Father        Tobacco Use    Smoking status: Never Smoker    Smokeless tobacco: Never Used   Substance and Sexual Activity    Alcohol use: Yes     Alcohol/week: 2.0 standard drinks     Types: 2 Glasses of wine per week    Drug use: Never    Sexual activity: Not on file     Review of Systems   Constitutional: Negative for  chills and fever.   HENT: Negative for rhinorrhea, sneezing and sore throat.    Eyes: Negative for pain and visual disturbance.   Respiratory: Negative for cough and shortness of breath.    Cardiovascular: Negative for chest pain.   Gastrointestinal: Positive for abdominal distention. Negative for abdominal pain, constipation and diarrhea.   Endocrine: Negative for cold intolerance and heat intolerance.   Genitourinary: Negative for difficulty urinating.   Musculoskeletal: Negative for arthralgias and joint swelling.   Skin: Negative for rash.   Allergic/Immunologic: Negative for environmental allergies.   Neurological: Negative for dizziness, syncope and weakness.   Hematological: Does not bruise/bleed easily.   Psychiatric/Behavioral: Negative for dysphoric mood. The patient is not nervous/anxious.      Objective:     Vital Signs (Most Recent):  Temp: 98 °F (36.7 °C) (02/05/22 0717)  Pulse: (!) 55 (02/05/22 0717)  Resp: 18 (02/05/22 0717)  BP: 132/61 (02/05/22 0717)  SpO2: (!) 93 % (02/05/22 0717) Vital Signs (24h Range):  Temp:  [97 °F (36.1 °C)-98 °F (36.7 °C)] 98 °F (36.7 °C)  Pulse:  [55-60] 55  Resp:  [18-20] 18  SpO2:  [93 %-97 %] 93 %  BP: (117-137)/(61-85) 132/61     Weight: 117.9 kg (260 lb)  Body mass index is 37.31 kg/m².    Physical Exam  Vitals and nursing note reviewed.   Constitutional:       Appearance: Normal appearance.   HENT:      Head: Normocephalic and atraumatic.      Nose: Nose normal.   Eyes:      Extraocular Movements: Extraocular movements intact.      Pupils: Pupils are equal, round, and reactive to light.   Cardiovascular:      Rate and Rhythm: Normal rate and regular rhythm.      Heart sounds: No murmur heard.  No friction rub. No gallop.    Pulmonary:      Effort: Pulmonary effort is normal.      Breath sounds: Normal breath sounds.   Abdominal:      General: Abdomen is flat. There is distension.      Palpations: Abdomen is soft.      Tenderness: There is no abdominal tenderness.       Comments: Hypoactive BS   Musculoskeletal:         General: No swelling or deformity.      Cervical back: Normal range of motion and neck supple.   Skin:     General: Skin is warm and dry.      Capillary Refill: Capillary refill takes less than 2 seconds.   Neurological:      General: No focal deficit present.      Mental Status: He is alert and oriented to person, place, and time.   Psychiatric:         Mood and Affect: Mood normal.         Behavior: Behavior normal.           CRANIAL NERVES     CN III, IV, VI   Pupils are equal, round, and reactive to light.       Significant Labs:   All pertinent labs within the past 24 hours have been reviewed.  CBC:   Recent Labs   Lab 02/03/22 1447 02/04/22 0427 02/05/22  0357   WBC 3.67* 3.67* 4.52   HGB 13.7* 12.5* 12.5*   HCT 40.1 37.3* 36.6*    160 150     CMP:   Recent Labs   Lab 02/03/22 1447 02/04/22 0427 02/05/22  0357    141 142   K 3.6 3.9 3.7    108 109   CO2 29 30* 28   * 100 116*   BUN 24* 17 12   CREATININE 1.1 1.0 0.9   CALCIUM 8.9 8.4* 8.2*   PROT 7.3 6.5 6.3   ALBUMIN 3.6 3.1* 2.8*   BILITOT 0.6 0.5 0.5   ALKPHOS 67 60 58   AST 17 15 14   ALT 23 19 18   ANIONGAP 6* 3* 5*   EGFRNONAA >60.0 >60.0 >60.0     Magnesium:   Recent Labs   Lab 02/05/22  0357   MG 1.8     POCT Glucose:   Recent Labs   Lab 02/05/22  0216 02/05/22  0558   POCTGLUCOSE 119* 132*       Significant Imaging: I have reviewed all pertinent imaging results/findings within the past 24 hours.

## 2022-02-06 LAB
ALBUMIN SERPL BCP-MCNC: 3 G/DL (ref 3.5–5.2)
ALP SERPL-CCNC: 60 U/L (ref 55–135)
ALT SERPL W/O P-5'-P-CCNC: 19 U/L (ref 10–44)
ANION GAP SERPL CALC-SCNC: 3 MMOL/L (ref 8–16)
AST SERPL-CCNC: 13 U/L (ref 10–40)
BASOPHILS # BLD AUTO: 0.04 K/UL (ref 0–0.2)
BASOPHILS NFR BLD: 0.8 % (ref 0–1.9)
BILIRUB SERPL-MCNC: 0.4 MG/DL (ref 0.1–1)
BUN SERPL-MCNC: 13 MG/DL (ref 8–23)
CALCIUM SERPL-MCNC: 8.7 MG/DL (ref 8.7–10.5)
CHLORIDE SERPL-SCNC: 106 MMOL/L (ref 95–110)
CO2 SERPL-SCNC: 32 MMOL/L (ref 23–29)
CREAT SERPL-MCNC: 1.1 MG/DL (ref 0.5–1.4)
DIFFERENTIAL METHOD: ABNORMAL
EOSINOPHIL # BLD AUTO: 0.2 K/UL (ref 0–0.5)
EOSINOPHIL NFR BLD: 3.3 % (ref 0–8)
ERYTHROCYTE [DISTWIDTH] IN BLOOD BY AUTOMATED COUNT: 11.9 % (ref 11.5–14.5)
EST. GFR  (AFRICAN AMERICAN): >60 ML/MIN/1.73 M^2
EST. GFR  (NON AFRICAN AMERICAN): >60 ML/MIN/1.73 M^2
GLUCOSE SERPL-MCNC: 112 MG/DL (ref 70–110)
HCT VFR BLD AUTO: 37.7 % (ref 40–54)
HGB BLD-MCNC: 12.9 G/DL (ref 14–18)
IMM GRANULOCYTES # BLD AUTO: 0.01 K/UL (ref 0–0.04)
IMM GRANULOCYTES NFR BLD AUTO: 0.2 % (ref 0–0.5)
LYMPHOCYTES # BLD AUTO: 1.3 K/UL (ref 1–4.8)
LYMPHOCYTES NFR BLD: 27.3 % (ref 18–48)
MAGNESIUM SERPL-MCNC: 1.7 MG/DL (ref 1.6–2.6)
MCH RBC QN AUTO: 30.7 PG (ref 27–31)
MCHC RBC AUTO-ENTMCNC: 34.2 G/DL (ref 32–36)
MCV RBC AUTO: 90 FL (ref 82–98)
MONOCYTES # BLD AUTO: 1.1 K/UL (ref 0.3–1)
MONOCYTES NFR BLD: 22.1 % (ref 4–15)
NEUTROPHILS # BLD AUTO: 2.2 K/UL (ref 1.8–7.7)
NEUTROPHILS NFR BLD: 46.3 % (ref 38–73)
NRBC BLD-RTO: 0 /100 WBC
PLATELET # BLD AUTO: 180 K/UL (ref 150–450)
PMV BLD AUTO: 10.1 FL (ref 9.2–12.9)
POCT GLUCOSE: 110 MG/DL (ref 70–110)
POCT GLUCOSE: 116 MG/DL (ref 70–110)
POCT GLUCOSE: 133 MG/DL (ref 70–110)
POCT GLUCOSE: 98 MG/DL (ref 70–110)
POTASSIUM SERPL-SCNC: 3.9 MMOL/L (ref 3.5–5.1)
PROT SERPL-MCNC: 6.3 G/DL (ref 6–8.4)
RBC # BLD AUTO: 4.2 M/UL (ref 4.6–6.2)
SODIUM SERPL-SCNC: 141 MMOL/L (ref 136–145)
WBC # BLD AUTO: 4.8 K/UL (ref 3.9–12.7)

## 2022-02-06 PROCEDURE — 63600175 PHARM REV CODE 636 W HCPCS: Performed by: INTERNAL MEDICINE

## 2022-02-06 PROCEDURE — 36415 COLL VENOUS BLD VENIPUNCTURE: CPT | Performed by: INTERNAL MEDICINE

## 2022-02-06 PROCEDURE — 25000003 PHARM REV CODE 250: Performed by: INTERNAL MEDICINE

## 2022-02-06 PROCEDURE — 80053 COMPREHEN METABOLIC PANEL: CPT | Performed by: INTERNAL MEDICINE

## 2022-02-06 PROCEDURE — 85025 COMPLETE CBC W/AUTO DIFF WBC: CPT | Performed by: INTERNAL MEDICINE

## 2022-02-06 PROCEDURE — 83735 ASSAY OF MAGNESIUM: CPT | Performed by: INTERNAL MEDICINE

## 2022-02-06 PROCEDURE — 25000003 PHARM REV CODE 250: Performed by: SURGERY

## 2022-02-06 PROCEDURE — 11000001 HC ACUTE MED/SURG PRIVATE ROOM

## 2022-02-06 RX ADMIN — ATORVASTATIN CALCIUM 20 MG: 20 TABLET, FILM COATED ORAL at 08:02

## 2022-02-06 RX ADMIN — EZETIMIBE 10 MG: 10 TABLET ORAL at 08:02

## 2022-02-06 RX ADMIN — ONDANSETRON HYDROCHLORIDE 4 MG: 2 SOLUTION INTRAMUSCULAR; INTRAVENOUS at 12:02

## 2022-02-06 RX ADMIN — BISACODYL 10 MG: 10 SUPPOSITORY RECTAL at 08:02

## 2022-02-06 RX ADMIN — FAMOTIDINE 20 MG: 10 INJECTION INTRAVENOUS at 08:02

## 2022-02-06 NOTE — SUBJECTIVE & OBJECTIVE
Past Medical History:   Diagnosis Date    Arthritis     Diabetes     GERD (gastroesophageal reflux disease)     Hyperlipidemia     Hypertension     PATRICIA (obstructive sleep apnea)        Past Surgical History:   Procedure Laterality Date    HERNIA REPAIR      MYRINGOTOMY W/ TUBES         Review of patient's allergies indicates:   Allergen Reactions    Codeine        No current facility-administered medications on file prior to encounter.     Current Outpatient Medications on File Prior to Encounter   Medication Sig    cholecalciferol, vitamin D3, 10 mcg (400 unit) Cap Take 400 Units by mouth once daily.    clobetasol 0.05% (TEMOVATE) 0.05 % Oint Apply topically 2 (two) times daily.    ezetimibe (ZETIA) 10 mg tablet TAKE 1 TABLET BY MOUTH DAILY    FLUoxetine 10 MG capsule Take 1 capsule (10 mg total) by mouth once daily.    lisinopriL 10 MG tablet TAKE 1 TABLET BY MOUTH DAILY    metFORMIN (GLUCOPHAGE) 1000 MG tablet TAKE 1 TABLET BY MOUTH TWICE DAILY    metoprolol tartrate (LOPRESSOR) 25 MG tablet TAKE 1 TABLET BY MOUTH DAILY    ondansetron (ZOFRAN-ODT) 4 MG TbDL Take 1 tablet (4 mg total) by mouth every 6 (six) hours as needed (nausea and vomiting).    rosuvastatin (CRESTOR) 20 MG tablet TAKE 1 TABLET BY MOUTH DAILY    sildenafil (REVATIO) 20 mg Tab Take 1 tablet (20 mg total) by mouth daily as needed (erectil dysfunction).    ALPRAZolam (XANAX) 0.25 MG tablet Take 1 tablet (0.25 mg total) by mouth daily as needed for Anxiety.     Family History     Problem Relation (Age of Onset)    Cancer Father    Colon cancer Mother    Coronary artery disease Father    Diabetes Mother, Father        Tobacco Use    Smoking status: Never Smoker    Smokeless tobacco: Never Used   Substance and Sexual Activity    Alcohol use: Yes     Alcohol/week: 2.0 standard drinks     Types: 2 Glasses of wine per week    Drug use: Never    Sexual activity: Not on file     Review of Systems   Constitutional: Negative for  chills and fever.   HENT: Negative for rhinorrhea, sneezing and sore throat.    Eyes: Negative for pain and visual disturbance.   Respiratory: Negative for cough and shortness of breath.    Cardiovascular: Negative for chest pain.   Gastrointestinal: Positive for abdominal distention. Negative for abdominal pain, constipation and diarrhea.   Endocrine: Negative for cold intolerance and heat intolerance.   Genitourinary: Negative for difficulty urinating.   Musculoskeletal: Negative for arthralgias and joint swelling.   Skin: Negative for rash.   Allergic/Immunologic: Negative for environmental allergies.   Neurological: Negative for dizziness, syncope and weakness.   Hematological: Does not bruise/bleed easily.   Psychiatric/Behavioral: Negative for dysphoric mood. The patient is not nervous/anxious.      Objective:     Vital Signs (Most Recent):  Temp: 97 °F (36.1 °C) (02/06/22 0726)  Pulse: (!) 58 (02/06/22 0726)  Resp: 18 (02/06/22 0726)  BP: 111/62 (02/06/22 0726)  SpO2: 96 % (02/06/22 0726) Vital Signs (24h Range):  Temp:  [97 °F (36.1 °C)-98.1 °F (36.7 °C)] 97 °F (36.1 °C)  Pulse:  [54-63] 58  Resp:  [18-20] 18  SpO2:  [95 %-98 %] 96 %  BP: (111-162)/(62-76) 111/62     Weight: 117.9 kg (260 lb)  Body mass index is 37.31 kg/m².    Physical Exam  Vitals and nursing note reviewed.   Constitutional:       Appearance: Normal appearance.   HENT:      Head: Normocephalic and atraumatic.      Nose: Nose normal.   Eyes:      Extraocular Movements: Extraocular movements intact.      Pupils: Pupils are equal, round, and reactive to light.   Cardiovascular:      Rate and Rhythm: Normal rate and regular rhythm.      Heart sounds: No murmur heard.  No friction rub. No gallop.    Pulmonary:      Effort: Pulmonary effort is normal.      Breath sounds: Normal breath sounds.   Abdominal:      General: Abdomen is flat. There is distension.      Palpations: Abdomen is soft.      Tenderness: There is no abdominal tenderness.       Comments: Hypoactive BS   Musculoskeletal:         General: No swelling or deformity.      Cervical back: Normal range of motion and neck supple.   Skin:     General: Skin is warm and dry.      Capillary Refill: Capillary refill takes less than 2 seconds.   Neurological:      General: No focal deficit present.      Mental Status: He is alert and oriented to person, place, and time.   Psychiatric:         Mood and Affect: Mood normal.         Behavior: Behavior normal.           CRANIAL NERVES     CN III, IV, VI   Pupils are equal, round, and reactive to light.       Significant Labs:   All pertinent labs within the past 24 hours have been reviewed.  CBC:   Recent Labs   Lab 02/05/22 0357 02/06/22 0456   WBC 4.52 4.80   HGB 12.5* 12.9*   HCT 36.6* 37.7*    180     CMP:   Recent Labs   Lab 02/05/22 0357 02/06/22 0456    141   K 3.7 3.9    106   CO2 28 32*   * 112*   BUN 12 13   CREATININE 0.9 1.1   CALCIUM 8.2* 8.7   PROT 6.3 6.3   ALBUMIN 2.8* 3.0*   BILITOT 0.5 0.4   ALKPHOS 58 60   AST 14 13   ALT 18 19   ANIONGAP 5* 3*   EGFRNONAA >60.0 >60.0     Magnesium:   Recent Labs   Lab 02/05/22 0357 02/06/22  0456   MG 1.8 1.7     POCT Glucose:   Recent Labs   Lab 02/05/22  1612 02/05/22  1951 02/06/22  0613   POCTGLUCOSE 138* 153* 110       Significant Imaging: I have reviewed all pertinent imaging results/findings within the past 24 hours.

## 2022-02-06 NOTE — ASSESSMENT & PLAN NOTE
Last A1c reviewed-   Lab Results   Component Value Date    HGBA1C 6.2 (H) 02/04/2022     Most recent fingerstick glucose reviewed-   Recent Labs   Lab 02/05/22  1120 02/05/22  1612 02/05/22  1951 02/06/22  0613   POCTGLUCOSE 124* 138* 153* 110     Current correctional scale:  Low  Anti-hyperglycemic dose as follows-   Antihyperglycemics (From admission, onward)            Start     Stop Route Frequency Ordered    02/04/22 1143  insulin aspart U-100 pen 0-5 Units         -- SubQ Every 6 hours PRN 02/04/22 1043        Hold Oral hypoglycemics while patient is in the hospital.

## 2022-02-06 NOTE — ASSESSMENT & PLAN NOTE
stable    BP Readings from Last 3 Encounters:   02/06/22 111/62   02/01/22 124/72   01/10/22 124/72

## 2022-02-06 NOTE — PROGRESS NOTES
[facility] Medicine  Progress Note    Patient Name: Luc Resendez  MRN: [id number]  Patient Class: IP- Inpatient   Admission Date: 2/3/2022  Length of Stay: 3 days  Attending Physician: Dustin Juarez Jr., MD  Primary Care Provider: Dustin Juarez Jr, MD        Subjective:     Principal Problem:Small bowel obstruction        HPI:  Chief Complaint   Patient presents with    Vomiting    Diarrhea       Pt stated that he has been experiencing abdominal cramping with n/v/d since Monday. Seen by PCP on Tuesday and Rx zofran - pt stating that symptoms are not improving.       66-year-old male with history of diabetes, hypertension, high cholesterol, acid reflux presents with diffuse abdominal pain which she describes as cramping since Monday.  Patient says the pain is episodic with associated nonbloody nonbilious vomiting and nonbloody diarrhea.  Patient denies any travel, antibiotic use, fever, trauma, dysuria.  Patient does have history of hernia and constipate            Overview/Hospital Course:  2/5/22 [facility] patient is sitting up in the chair. NGT was discontinued yesterday and tolerated breakfast this am. He reports passing a lot of gas, 2 BM on yesterday. Will monitor and likely discharge home tomorrow with outpatient follow up with general surgery    2/6/22 [facility] patient's wife at bedside. Patient reports abdominal pain and vomiting last night after dinner. Wife reports vomiting a lot. He did get up and ambulate with assistance and reports passing lots of gas per rectum. Did have BM small on yesterday. Continue current diet and monitor.       Past Medical History:   Diagnosis Date    Arthritis     Diabetes     GERD (gastroesophageal reflux disease)     Hyperlipidemia     Hypertension     PATRICIA (obstructive sleep apnea)        Past Surgical History:   Procedure Laterality Date    HERNIA REPAIR      MYRINGOTOMY W/ TUBES         Review of patient's allergies indicates:   Allergen Reactions     Codeine        No current facility-administered medications on file prior to encounter.     Current Outpatient Medications on File Prior to Encounter   Medication Sig    cholecalciferol, vitamin D3, 10 mcg (400 unit) Cap Take 400 Units by mouth once daily.    clobetasol 0.05% (TEMOVATE) 0.05 % Oint Apply topically 2 (two) times daily.    ezetimibe (ZETIA) 10 mg tablet TAKE 1 TABLET BY MOUTH DAILY    FLUoxetine 10 MG capsule Take 1 capsule (10 mg total) by mouth once daily.    lisinopriL 10 MG tablet TAKE 1 TABLET BY MOUTH DAILY    metFORMIN (GLUCOPHAGE) 1000 MG tablet TAKE 1 TABLET BY MOUTH TWICE DAILY    metoprolol tartrate (LOPRESSOR) 25 MG tablet TAKE 1 TABLET BY MOUTH DAILY    ondansetron (ZOFRAN-ODT) 4 MG TbDL Take 1 tablet (4 mg total) by mouth every 6 (six) hours as needed (nausea and vomiting).    rosuvastatin (CRESTOR) 20 MG tablet TAKE 1 TABLET BY MOUTH DAILY    sildenafil (REVATIO) 20 mg Tab Take 1 tablet (20 mg total) by mouth daily as needed (erectil dysfunction).    ALPRAZolam (XANAX) 0.25 MG tablet Take 1 tablet (0.25 mg total) by mouth daily as needed for Anxiety.     Family History     Problem Relation (Age of Onset)    Cancer Father    Colon cancer Mother    Coronary artery disease Father    Diabetes Mother, Father        Tobacco Use    Smoking status: Never Smoker    Smokeless tobacco: Never Used   Substance and Sexual Activity    Alcohol use: Yes     Alcohol/week: 2.0 standard drinks     Types: 2 Glasses of wine per week    Drug use: Never    Sexual activity: Not on file     Review of Systems   Constitutional: Negative for chills and fever.   HENT: Negative for rhinorrhea, sneezing and sore throat.    Eyes: Negative for pain and visual disturbance.   Respiratory: Negative for cough and shortness of breath.    Cardiovascular: Negative for chest pain.   Gastrointestinal: Positive for abdominal distention. Negative for abdominal pain, constipation and diarrhea.   Endocrine:  Negative for cold intolerance and heat intolerance.   Genitourinary: Negative for difficulty urinating.   Musculoskeletal: Negative for arthralgias and joint swelling.   Skin: Negative for rash.   Allergic/Immunologic: Negative for environmental allergies.   Neurological: Negative for dizziness, syncope and weakness.   Hematological: Does not bruise/bleed easily.   Psychiatric/Behavioral: Negative for dysphoric mood. The patient is not nervous/anxious.      Objective:     Vital Signs (Most Recent):  Temp: 97 °F (36.1 °C) (02/06/22 0726)  Pulse: (!) 58 (02/06/22 0726)  Resp: 18 (02/06/22 0726)  BP: 111/62 (02/06/22 0726)  SpO2: 96 % (02/06/22 0726) Vital Signs (24h Range):  Temp:  [97 °F (36.1 °C)-98.1 °F (36.7 °C)] 97 °F (36.1 °C)  Pulse:  [54-63] 58  Resp:  [18-20] 18  SpO2:  [95 %-98 %] 96 %  BP: (111-162)/(62-76) 111/62     Weight: 117.9 kg (260 lb)  Body mass index is 37.31 kg/m².    Physical Exam  Vitals and nursing note reviewed.   Constitutional:       Appearance: Normal appearance.   HENT:      Head: Normocephalic and atraumatic.      Nose: Nose normal.   Eyes:      Extraocular Movements: Extraocular movements intact.      Pupils: Pupils are equal, round, and reactive to light.   Cardiovascular:      Rate and Rhythm: Normal rate and regular rhythm.      Heart sounds: No murmur heard.  No friction rub. No gallop.    Pulmonary:      Effort: Pulmonary effort is normal.      Breath sounds: Normal breath sounds.   Abdominal:      General: Abdomen is flat. There is distension.      Palpations: Abdomen is soft.      Tenderness: There is no abdominal tenderness.      Comments: Hypoactive BS   Musculoskeletal:         General: No swelling or deformity.      Cervical back: Normal range of motion and neck supple.   Skin:     General: Skin is warm and dry.      Capillary Refill: Capillary refill takes less than 2 seconds.   Neurological:      General: No focal deficit present.      Mental Status: He is alert and  oriented to person, place, and time.   Psychiatric:         Mood and Affect: Mood normal.         Behavior: Behavior normal.           CRANIAL NERVES     CN III, IV, VI   Pupils are equal, round, and reactive to light.       Significant Labs:   All pertinent labs within the past 24 hours have been reviewed.  CBC:   Recent Labs   Lab 02/05/22 0357 02/06/22 0456   WBC 4.52 4.80   HGB 12.5* 12.9*   HCT 36.6* 37.7*    180     CMP:   Recent Labs   Lab 02/05/22 0357 02/06/22 0456    141   K 3.7 3.9    106   CO2 28 32*   * 112*   BUN 12 13   CREATININE 0.9 1.1   CALCIUM 8.2* 8.7   PROT 6.3 6.3   ALBUMIN 2.8* 3.0*   BILITOT 0.5 0.4   ALKPHOS 58 60   AST 14 13   ALT 18 19   ANIONGAP 5* 3*   EGFRNONAA >60.0 >60.0     Magnesium:   Recent Labs   Lab 02/05/22 0357 02/06/22 0456   MG 1.8 1.7     POCT Glucose:   Recent Labs   Lab 02/05/22  1612 02/05/22 1951 02/06/22  0613   POCTGLUCOSE 138* 153* 110       Significant Imaging: I have reviewed all pertinent imaging results/findings within the past 24 hours.      Assessment/Plan:      * Small bowel obstruction  GS following.  NGT placed.  Now passing flatus.      Continue treatment per GS recs    2/5/22 [facility] NGT has been discontinued, tolerating diet x 1 meal--passing gas and BM x2 will monitor next 24 hours and likely discharge home tomorrow with outpatient follow up    2/6/22 [facility] + vomiting and pain episode last night is passing gas and having bowel movement continue to monitor.       Type II diabetes mellitus  Last A1c reviewed-   Lab Results   Component Value Date    HGBA1C 6.2 (H) 02/04/2022     Most recent fingerstick glucose reviewed-   Recent Labs   Lab 02/05/22  1120 02/05/22  1612 02/05/22 1951 02/06/22  0613   POCTGLUCOSE 124* 138* 153* 110     Current correctional scale:  Low  Anti-hyperglycemic dose as follows-   Antihyperglycemics (From admission, onward)            Start     Stop Route Frequency Ordered    02/04/22 1143  insulin aspart  U-100 pen 0-5 Units         -- SubQ Every 6 hours PRN 02/04/22 1043        Hold Oral hypoglycemics while patient is in the hospital.        GERD (gastroesophageal reflux disease)  Continue PPI      Hyperlipidemia  Restart statin when stable    Hypertension  stable    BP Readings from Last 3 Encounters:   02/06/22 111/62   02/01/22 124/72   01/10/22 124/72           Obesity  Body mass index is 37.31 kg/m². Morbid obesity complicates all aspects of disease management from diagnostic modalities to treatment. Weight loss encouraged and health benefits explained to patient.           VTE Risk Mitigation (From admission, onward)         Ordered     IP VTE HIGH RISK PATIENT  Once         02/03/22 1753     Place sequential compression device  Until discontinued         02/03/22 1753                Discharge Planning   DAKOTA:      Code Status: Full Code   Is the patient medically ready for discharge?:     Reason for patient still in hospital (select all that apply): Patient trending condition, Laboratory test, Treatment and PT / OT recommendations  Discharge Plan A: Home                  Elisa Temple NP  Department of Hospital Medicine   [facility]

## 2022-02-06 NOTE — ASSESSMENT & PLAN NOTE
GS following.  NGT placed.  Now passing flatus.      Continue treatment per GS recs    2/5/22 [facility] NGT has been discontinued, tolerating diet x 1 meal--passing gas and BM x2 will monitor next 24 hours and likely discharge home tomorrow with outpatient follow up    2/6/22 [facility] + vomiting and pain episode last night is passing gas and having bowel movement continue to monitor.

## 2022-02-06 NOTE — NURSING
After the start of my shift the patients family member presented to the nurses station stating that her father was complaining of upper abdominal pain rating a 12 on a scale of 1-10. Morphine 2 mg injection was administered via iv by Mica Yusuf RN. I checked on the patient around 9 pm and the patient reported being pain free.Patient contacted nurses station at 2255 stating that he was in pain again. Tylenol 650 mg was administered and a warm towel was placed on his stomach and patient placed on his right side with knees up to try and expel some gas, pt verbalized understanding and was successful. After 10 minutes the patient rotated to laying on his left side and was able to release more gas a belch as well. I encouraged patient to ambulate in [place] with daughter. Pt successfully ambulated in [place] with daughter at his side and was able to release more gas. Upon accessing pain level after patient ambulated the patient stated that he was no longer in pain and his stomach was feeling much better.

## 2022-02-06 NOTE — PLAN OF CARE
02/06/22 1128   Medicare Message   Important Message from Medicare regarding Discharge Appeal Rights Given to patient/caregiver;Explained to patient/caregiver;Signed/date by patient/caregiver   Date IMM was signed 02/06/22   Time IMM was signed 1059     IMM discussed with patient. Voiced understanding.  Copy provided.

## 2022-02-07 VITALS
TEMPERATURE: 97 F | DIASTOLIC BLOOD PRESSURE: 56 MMHG | SYSTOLIC BLOOD PRESSURE: 120 MMHG | RESPIRATION RATE: 18 BRPM | OXYGEN SATURATION: 96 % | HEART RATE: 58 BPM | BODY MASS INDEX: 37.22 KG/M2 | HEIGHT: 70 IN | WEIGHT: 260 LBS

## 2022-02-07 LAB
ALBUMIN SERPL BCP-MCNC: 3.2 G/DL (ref 3.5–5.2)
ALP SERPL-CCNC: 66 U/L (ref 55–135)
ALT SERPL W/O P-5'-P-CCNC: 28 U/L (ref 10–44)
ANION GAP SERPL CALC-SCNC: 3 MMOL/L (ref 8–16)
AST SERPL-CCNC: 20 U/L (ref 10–40)
BASOPHILS # BLD AUTO: 0.03 K/UL (ref 0–0.2)
BASOPHILS NFR BLD: 0.6 % (ref 0–1.9)
BILIRUB SERPL-MCNC: 0.5 MG/DL (ref 0.1–1)
BUN SERPL-MCNC: 12 MG/DL (ref 8–23)
CALCIUM SERPL-MCNC: 8.8 MG/DL (ref 8.7–10.5)
CHLORIDE SERPL-SCNC: 108 MMOL/L (ref 95–110)
CO2 SERPL-SCNC: 29 MMOL/L (ref 23–29)
CREAT SERPL-MCNC: 1 MG/DL (ref 0.5–1.4)
DIFFERENTIAL METHOD: ABNORMAL
EOSINOPHIL # BLD AUTO: 0.1 K/UL (ref 0–0.5)
EOSINOPHIL NFR BLD: 2.9 % (ref 0–8)
ERYTHROCYTE [DISTWIDTH] IN BLOOD BY AUTOMATED COUNT: 11.9 % (ref 11.5–14.5)
EST. GFR  (AFRICAN AMERICAN): >60 ML/MIN/1.73 M^2
EST. GFR  (NON AFRICAN AMERICAN): >60 ML/MIN/1.73 M^2
GLUCOSE SERPL-MCNC: 93 MG/DL (ref 70–110)
HCT VFR BLD AUTO: 38.2 % (ref 40–54)
HGB BLD-MCNC: 13.2 G/DL (ref 14–18)
IMM GRANULOCYTES # BLD AUTO: 0.01 K/UL (ref 0–0.04)
IMM GRANULOCYTES NFR BLD AUTO: 0.2 % (ref 0–0.5)
LYMPHOCYTES # BLD AUTO: 1.3 K/UL (ref 1–4.8)
LYMPHOCYTES NFR BLD: 27.7 % (ref 18–48)
MAGNESIUM SERPL-MCNC: 1.8 MG/DL (ref 1.6–2.6)
MCH RBC QN AUTO: 30.8 PG (ref 27–31)
MCHC RBC AUTO-ENTMCNC: 34.6 G/DL (ref 32–36)
MCV RBC AUTO: 89 FL (ref 82–98)
MONOCYTES # BLD AUTO: 0.8 K/UL (ref 0.3–1)
MONOCYTES NFR BLD: 16.4 % (ref 4–15)
NEUTROPHILS # BLD AUTO: 2.5 K/UL (ref 1.8–7.7)
NEUTROPHILS NFR BLD: 52.2 % (ref 38–73)
NRBC BLD-RTO: 0 /100 WBC
PLATELET # BLD AUTO: 186 K/UL (ref 150–450)
PMV BLD AUTO: 10 FL (ref 9.2–12.9)
POCT GLUCOSE: 97 MG/DL (ref 70–110)
POTASSIUM SERPL-SCNC: 4.1 MMOL/L (ref 3.5–5.1)
PROT SERPL-MCNC: 7 G/DL (ref 6–8.4)
RBC # BLD AUTO: 4.28 M/UL (ref 4.6–6.2)
SODIUM SERPL-SCNC: 140 MMOL/L (ref 136–145)
WBC # BLD AUTO: 4.76 K/UL (ref 3.9–12.7)

## 2022-02-07 PROCEDURE — 25000003 PHARM REV CODE 250: Performed by: SURGERY

## 2022-02-07 PROCEDURE — 80053 COMPREHEN METABOLIC PANEL: CPT | Performed by: INTERNAL MEDICINE

## 2022-02-07 PROCEDURE — 25000003 PHARM REV CODE 250: Performed by: INTERNAL MEDICINE

## 2022-02-07 PROCEDURE — 85025 COMPLETE CBC W/AUTO DIFF WBC: CPT | Performed by: INTERNAL MEDICINE

## 2022-02-07 PROCEDURE — 83735 ASSAY OF MAGNESIUM: CPT | Performed by: INTERNAL MEDICINE

## 2022-02-07 PROCEDURE — 36415 COLL VENOUS BLD VENIPUNCTURE: CPT | Performed by: INTERNAL MEDICINE

## 2022-02-07 RX ADMIN — ATORVASTATIN CALCIUM 20 MG: 20 TABLET, FILM COATED ORAL at 09:02

## 2022-02-07 RX ADMIN — EZETIMIBE 10 MG: 10 TABLET ORAL at 09:02

## 2022-02-07 RX ADMIN — BISACODYL 10 MG: 10 SUPPOSITORY RECTAL at 09:02

## 2022-02-07 RX ADMIN — FAMOTIDINE 20 MG: 10 INJECTION INTRAVENOUS at 09:02

## 2022-02-07 NOTE — ASSESSMENT & PLAN NOTE
stable    BP Readings from Last 3 Encounters:   02/07/22 (!) 120/56   02/01/22 124/72   01/10/22 124/72

## 2022-02-07 NOTE — ASSESSMENT & PLAN NOTE
GS following.  NGT placed.  Now passing flatus.      Continue treatment per GS recs    2/5/22 [facility] NGT has been discontinued, tolerating diet x 1 meal--passing gas and BM x2 will monitor next 24 hours and likely discharge home tomorrow with outpatient follow up    2/6/22 [facility] + vomiting and pain episode last night is passing gas and having bowel movement continue to monitor.     2/7: Patient now tolerating by mouth.  We will discharge with close outpatient follow-up.

## 2022-02-07 NOTE — PLAN OF CARE
[facility]  Discharge Final Note    Primary Care Provider: Dustin Juarez Jr, MD    Expected Discharge Date: 2/7/2022    Final Discharge Note (most recent)     Final Note - 02/07/22 1118        Final Note    Assessment Type Final Discharge Note     Anticipated Discharge Disposition Home or Self Care     Hospital Resources/Appts/Education Provided Appointments scheduled and added to AVS        Post-Acute Status    Discharge Delays None known at this time                 Important Message from Medicare  Important Message from Medicare regarding Discharge Appeal Rights: Given to patient/caregiver,Explained to patient/caregiver,Signed/date by patient/caregiver     Date IMM was signed: 02/06/22  Time IMM was signed: 1059    Contact Info     Dustin Juarez Jr, MD   Specialty: Internal Medicine   Relationship: PCP - General    [address] [place] [address]   Phone: [phone]       Next Steps: Follow up    Instructions: As needed, If symptoms worsen    Dustin Juarez Jr, MD   Specialty: Internal Medicine   Relationship: PCP - General    [address] [place] [address]   Phone: [phone]       Next Steps: Follow up on 2/11/2022    Instructions: 10:00      Final note is completed. The patient was discharge home with self-care. He does not require any social service needs at this time.

## 2022-02-07 NOTE — ASSESSMENT & PLAN NOTE
Last A1c reviewed-   Lab Results   Component Value Date    HGBA1C 6.2 (H) 02/04/2022     Most recent fingerstick glucose reviewed-   Recent Labs   Lab 02/06/22  1120 02/06/22  1628 02/06/22  2019 02/07/22  0458   POCTGLUCOSE 116* 133* 98 97     Current correctional scale:  Low  Anti-hyperglycemic dose as follows-   Antihyperglycemics (From admission, onward)            Start     Stop Route Frequency Ordered    02/04/22 1143  insulin aspart U-100 pen 0-5 Units         -- SubQ Every 6 hours PRN 02/04/22 1043        Hold Oral hypoglycemics while patient is in the hospital.

## 2022-02-07 NOTE — DISCHARGE SUMMARY
[facility] Medicine  Discharge Summary      Patient Name: Luc Resendez  MRN: [id number]  Patient Class: IP- Inpatient  Admission Date: 2/3/2022  Hospital Length of Stay: 4 days  Discharge Date and Time:  02/07/2022 9:21 AM  Attending Physician: Dsutin Juarez Jr., MD   Discharging Provider: Dustin Juarez Jr, MD  Primary Care Provider: Dustin Juarez Jr, MD      HPI:   Chief Complaint   Patient presents with    Vomiting    Diarrhea       Pt stated that he has been experiencing abdominal cramping with n/v/d since Monday. Seen by PCP on Tuesday and Rx zofran - pt stating that symptoms are not improving.       66-year-old male with history of diabetes, hypertension, high cholesterol, acid reflux presents with diffuse abdominal pain which she describes as cramping since Monday.  Patient says the pain is episodic with associated nonbloody nonbilious vomiting and nonbloody diarrhea.  Patient denies any travel, antibiotic use, fever, trauma, dysuria.  Patient does have history of hernia and constipate            * No surgery found *      Hospital Course:   2/5/22 [facility] patient is sitting up in the chair. NGT was discontinued yesterday and tolerated breakfast this am. He reports passing a lot of gas, 2 BM on yesterday. Will monitor and likely discharge home tomorrow with outpatient follow up with general surgery    2/6/22 [facility] patient's wife at bedside. Patient reports abdominal pain and vomiting last night after dinner. Wife reports vomiting a lot. He did get up and ambulate with assistance and reports passing lots of gas per rectum. Did have BM small on yesterday. Continue current diet and monitor.   2/7: The patient is tolerating by mouth and is having flatus.  Will discharge with close outpatient follow-up.       Goals of Care Treatment Preferences:  Code Status: Full Code      Consults:   Consults (From admission, onward)        Status Ordering Provider     Inpatient consult to General Surgery   Once        Provider:  Anastasiia Walters MD    Completed BENJI SINGH          * Small bowel obstruction  GS following.  NGT placed.  Now passing flatus.      Continue treatment per GS recs    2/5/22 [facility] NGT has been discontinued, tolerating diet x 1 meal--passing gas and BM x2 will monitor next 24 hours and likely discharge home tomorrow with outpatient follow up    2/6/22 [facility] + vomiting and pain episode last night is passing gas and having bowel movement continue to monitor.     2/7: Patient now tolerating by mouth.  We will discharge with close outpatient follow-up.      Type II diabetes mellitus  Last A1c reviewed-   Lab Results   Component Value Date    HGBA1C 6.2 (H) 02/04/2022     Most recent fingerstick glucose reviewed-   Recent Labs   Lab 02/06/22  1120 02/06/22  1628 02/06/22  2019 02/07/22  0458   POCTGLUCOSE 116* 133* 98 97     Current correctional scale:  Low  Anti-hyperglycemic dose as follows-   Antihyperglycemics (From admission, onward)            Start     Stop Route Frequency Ordered    02/04/22 1143  insulin aspart U-100 pen 0-5 Units         -- SubQ Every 6 hours PRN 02/04/22 1043        Hold Oral hypoglycemics while patient is in the hospital.        GERD (gastroesophageal reflux disease)  Continue PPI      Hyperlipidemia  Restart statin when stable    Hypertension  stable    BP Readings from Last 3 Encounters:   02/07/22 (!) 120/56   02/01/22 124/72   01/10/22 124/72           Obesity  Body mass index is 37.31 kg/m². Morbid obesity complicates all aspects of disease management from diagnostic modalities to treatment. Weight loss encouraged and health benefits explained to patient.           Final Active Diagnoses:    Diagnosis Date Noted POA    PRINCIPAL PROBLEM:  Small bowel obstruction [K56.609] 02/03/2022 Unknown    Type II diabetes mellitus [E11.9] 03/22/2021 Yes    Obesity [E66.9] 03/22/2021 Yes    Hypertension [I10] 03/22/2021 Yes    Hyperlipidemia [E78.5] 03/22/2021 Yes     GERD (gastroesophageal reflux disease) [K21.9] 03/22/2021 Yes      Problems Resolved During this Admission:       Discharged Condition: good    Disposition:     Follow Up:   Follow-up Information     Dustin Juarez Jr, MD.    Specialty: Internal Medicine  Why: As needed, If symptoms worsen  Contact information:  [address] [place] [id number]  [phone]                       Patient Instructions:   No discharge procedures on file.    Significant Diagnostic Studies: Labs: All labs within the past 24 hours have been reviewed    Pending Diagnostic Studies:     None         Medications:  Reconciled Home Medications:      Medication List      START taking these medications    dicyclomine 20 mg tablet  Commonly known as: BENTYL  Take 1 tablet (20 mg total) by mouth 2 (two) times daily.     metoclopramide HCl 10 MG tablet  Commonly known as: REGLAN  Take 1 tablet (10 mg total) by mouth every 6 (six) hours.        ASK your doctor about these medications    ALPRAZolam 0.25 MG tablet  Commonly known as: XANAX  Take 1 tablet (0.25 mg total) by mouth daily as needed for Anxiety.     cholecalciferol (vitamin D3) 10 mcg (400 unit) Cap  Take 400 Units by mouth once daily.     clobetasol 0.05% 0.05 % Oint  Commonly known as: TEMOVATE  Apply topically 2 (two) times daily.     ezetimibe 10 mg tablet  Commonly known as: ZETIA  TAKE 1 TABLET BY MOUTH DAILY     FLUoxetine 10 MG capsule  Take 1 capsule (10 mg total) by mouth once daily.     lisinopriL 10 MG tablet  TAKE 1 TABLET BY MOUTH DAILY     metFORMIN 1000 MG tablet  Commonly known as: GLUCOPHAGE  TAKE 1 TABLET BY MOUTH TWICE DAILY     metoprolol tartrate 25 MG tablet  Commonly known as: LOPRESSOR  TAKE 1 TABLET BY MOUTH DAILY     ondansetron 4 MG Tbdl  Commonly known as: ZOFRAN-ODT  Take 1 tablet (4 mg total) by mouth every 6 (six) hours as needed (nausea and vomiting).     rosuvastatin 20 MG tablet  Commonly known as: CRESTOR  TAKE 1 TABLET BY MOUTH DAILY      sildenafil 20 mg Tab  Commonly known as: REVATIO  Take 1 tablet (20 mg total) by mouth daily as needed (erectil dysfunction).            Indwelling Lines/Drains at time of discharge:   Lines/Drains/Airways     None                 Time spent on the discharge of patient: 60 minutes         Dustin Juarez Jr, MD  Department of Hospital Medicine  [facility]

## 2022-02-10 NOTE — PHYSICIAN QUERY
PT Name: Luc Resendez  MR #: [id number]     DOCUMENTATION CLARIFICATION     CDS/: Asha Beltran RN, CDS               Contact information: [email or web address]    This form is a permanent document in the medical record.     Query Date: February 10, 2022    By submitting this query, we are merely seeking further clarification of documentation to reflect the severity of illness of your patient. Please utilize your independent clinical judgment when addressing the question(s) below.    The medical record reflects the following:     Indicators   Supporting Clinical Findings Location in Medical Record   x Bowel obstruction, intestinal obstruction, LBO or SBO documented Small bowel obstruction  Unsure if enteritis/ileus with partial obstruction.     Small bowel obstruction  GS following.     Clinically more of ileus   Surgery Consult 2/3         H&P 2/4       Surgery PN 2/4    x Radiology findings There are dilated, fluid-filled segments of mid small bowel with collapse of the distal small bowel, compatible with bowel obstruction.    CT Abdomen 2/3    x Treatment/Medication  NGT placed.  Now passing flatus H&P 2/4     Procedure/Surgery     x Other  Few days of abdominal pain that started as nausea and vomiting with diarrhea.  Family members were also sick at home.  Took Immodium for diarrhea and continued to have pain and nausea/vomiting.  Last vomitus was last night.  Pain continues and feels similar to hospitalization after hernia repair.  Reports some flatus this afternoon.  Last BM was this AM.  Persistent nausea Surgery Consult 2/3      Provider, please further specify the bowel obstruction diagnosis:  [  x ] Partial or incomplete intestinal obstruction, due to known or suspected etiology (please specify): __adhesions__________   [   ] Partial or incomplete intestinal obstruction, unknown or unspecified etiology   [   ] Complete intestinal obstruction, due to known or suspected etiology (please  specify): ____________   [   ] Complete intestinal obstruction, unknown or unspecified etiology   [   ] Ileus, (please further specify type): ___________________   [   ] Other intestinal condition (please specify): _____________________   [   ] Clinically Undetermined           Please document in your progress notes daily for the duration of treatment until resolved, and include in your discharge summary.

## 2022-02-14 PROBLEM — Z12.11 ENCOUNTER FOR SCREENING FOR MALIGNANT NEOPLASM OF COLON: Status: ACTIVE | Noted: 2022-02-14

## 2022-02-14 PROBLEM — J06.9 ACUTE URI: Status: RESOLVED | Noted: 2022-01-10 | Resolved: 2022-02-14

## 2022-03-07 PROBLEM — Z00.00 WELLNESS EXAMINATION: Status: RESOLVED | Noted: 2021-12-01 | Resolved: 2022-03-07

## 2022-04-06 PROBLEM — Z12.5 SCREENING PSA (PROSTATE SPECIFIC ANTIGEN): Status: ACTIVE | Noted: 2022-04-06

## 2022-10-06 PROBLEM — H61.20 CERUMEN IMPACTION: Status: RESOLVED | Noted: 2021-11-09 | Resolved: 2022-10-06

## 2022-10-06 PROBLEM — H93.19 TINNITUS: Status: RESOLVED | Noted: 2021-03-22 | Resolved: 2022-10-06

## 2022-10-06 PROBLEM — L60.0 INGROWING NAIL: Status: ACTIVE | Noted: 2022-10-06

## 2022-12-12 PROBLEM — H92.01 ACUTE OTALGIA, RIGHT: Status: ACTIVE | Noted: 2022-12-12

## 2022-12-12 PROBLEM — J32.9 SINUSITIS: Status: ACTIVE | Noted: 2022-12-12

## 2022-12-12 PROBLEM — H66.90 EAR INFECTION: Status: RESOLVED | Noted: 2022-12-12 | Resolved: 2022-12-12

## 2022-12-12 PROBLEM — H66.90 EAR INFECTION: Status: ACTIVE | Noted: 2022-12-12

## 2023-04-17 PROBLEM — Z00.00 ROUTINE GENERAL MEDICAL EXAMINATION AT A HEALTH CARE FACILITY: Status: ACTIVE | Noted: 2023-04-17

## 2023-07-17 PROBLEM — Z00.00 WELLNESS EXAMINATION: Status: RESOLVED | Noted: 2021-12-01 | Resolved: 2023-07-17

## 2023-07-17 PROBLEM — Z00.00 ROUTINE GENERAL MEDICAL EXAMINATION AT A HEALTH CARE FACILITY: Status: RESOLVED | Noted: 2023-04-17 | Resolved: 2023-07-17

## 2023-09-07 ENCOUNTER — HOSPITAL ENCOUNTER (OUTPATIENT)
Dept: RADIOLOGY | Facility: HOSPITAL | Age: 68
Discharge: HOME | End: 2023-09-07
Attending: PHYSICIAN ASSISTANT
Payer: MEDICARE

## 2023-09-07 DIAGNOSIS — M79.605 LOWER EXTREMITY PAIN, LEFT: Primary | ICD-10-CM

## 2023-09-07 DIAGNOSIS — M79.605 LOWER EXTREMITY PAIN, LEFT: ICD-10-CM

## 2023-09-19 ENCOUNTER — LAB VISIT (OUTPATIENT)
Dept: LAB | Facility: HOSPITAL | Age: 68
End: 2023-09-19
Attending: INTERNAL MEDICINE
Payer: MEDICARE

## 2023-09-19 DIAGNOSIS — I10 PRIMARY HYPERTENSION: ICD-10-CM

## 2023-09-19 DIAGNOSIS — Z79.899 ENCOUNTER FOR LONG-TERM (CURRENT) USE OF OTHER MEDICATIONS: ICD-10-CM

## 2023-09-19 DIAGNOSIS — E11.69 TYPE 2 DIABETES MELLITUS WITH OTHER SPECIFIED COMPLICATION, WITH LONG-TERM CURRENT USE OF INSULIN: ICD-10-CM

## 2023-09-19 DIAGNOSIS — E88.89 DEFICIENCY OF COENZYME Q10: ICD-10-CM

## 2023-09-19 DIAGNOSIS — E78.5 HYPERLIPIDEMIA, UNSPECIFIED HYPERLIPIDEMIA TYPE: ICD-10-CM

## 2023-09-19 DIAGNOSIS — E78.2 MIXED HYPERLIPIDEMIA: ICD-10-CM

## 2023-09-19 DIAGNOSIS — I65.22 STENOSIS OF LEFT CAROTID ARTERY: ICD-10-CM

## 2023-09-19 DIAGNOSIS — E55.9 VITAMIN D DEFICIENCY: ICD-10-CM

## 2023-09-19 DIAGNOSIS — E66.9 OBESITY, UNSPECIFIED CLASSIFICATION, UNSPECIFIED OBESITY TYPE, UNSPECIFIED WHETHER SERIOUS COMORBIDITY PRESENT: ICD-10-CM

## 2023-09-19 DIAGNOSIS — D64.9 ANEMIA, UNSPECIFIED TYPE: ICD-10-CM

## 2023-09-19 DIAGNOSIS — Z79.4 TYPE 2 DIABETES MELLITUS WITH OTHER SPECIFIED COMPLICATION, WITH LONG-TERM CURRENT USE OF INSULIN: ICD-10-CM

## 2023-09-19 DIAGNOSIS — E63.0 ESSENTIAL FATTY ACID (EFA) DEFICIENCY: ICD-10-CM

## 2023-09-19 LAB
ALBUMIN SERPL BCP-MCNC: 4.4 G/DL (ref 3.5–5.2)
ALBUMIN/CREAT UR: 4.7 UG/MG (ref 0–30)
ALP SERPL-CCNC: 74 U/L (ref 55–135)
ALT SERPL W/O P-5'-P-CCNC: 28 U/L (ref 10–44)
ANION GAP SERPL CALC-SCNC: 0 MMOL/L (ref 3–11)
AST SERPL-CCNC: 14 U/L (ref 10–40)
BASOPHILS # BLD AUTO: 0.07 K/UL (ref 0–0.2)
BASOPHILS NFR BLD: 0.8 % (ref 0–1.9)
BILIRUB SERPL-MCNC: 0.6 MG/DL (ref 0.1–1)
BUN SERPL-MCNC: 22 MG/DL (ref 8–23)
CALCIUM SERPL-MCNC: 9.7 MG/DL (ref 8.7–10.5)
CHLORIDE SERPL-SCNC: 103 MMOL/L (ref 95–110)
CHOLEST SERPL-MCNC: 119 MG/DL (ref 120–199)
CHOLEST/HDLC SERPL: 2.6 {RATIO} (ref 2–5)
CO2 SERPL-SCNC: 33 MMOL/L (ref 23–29)
CREAT SERPL-MCNC: 1.1 MG/DL (ref 0.5–1.4)
CREAT UR-MCNC: 160 MG/DL (ref 23–375)
DIFFERENTIAL METHOD: ABNORMAL
EOSINOPHIL # BLD AUTO: 0.2 K/UL (ref 0–0.5)
EOSINOPHIL NFR BLD: 1.8 % (ref 0–8)
ERYTHROCYTE [DISTWIDTH] IN BLOOD BY AUTOMATED COUNT: 12.6 % (ref 11.5–14.5)
EST. GFR  (NO RACE VARIABLE): >60 ML/MIN/1.73 M^2
ESTIMATED AVG GLUCOSE: 120 MG/DL (ref 68–131)
GLUCOSE SERPL-MCNC: 122 MG/DL (ref 70–110)
HBA1C MFR BLD: 5.8 % (ref 4–5.6)
HCT VFR BLD AUTO: 45.8 % (ref 40–54)
HDLC SERPL-MCNC: 46 MG/DL (ref 40–75)
HDLC SERPL: 38.7 % (ref 20–50)
HGB BLD-MCNC: 15.5 G/DL (ref 14–18)
IMM GRANULOCYTES # BLD AUTO: 0.02 K/UL (ref 0–0.04)
IMM GRANULOCYTES NFR BLD AUTO: 0.2 % (ref 0–0.5)
LDLC SERPL CALC-MCNC: 61.4 MG/DL (ref 63–159)
LYMPHOCYTES # BLD AUTO: 1.8 K/UL (ref 1–4.8)
LYMPHOCYTES NFR BLD: 21 % (ref 18–48)
MAGNESIUM SERPL-MCNC: 1.9 MG/DL (ref 1.6–2.6)
MCH RBC QN AUTO: 31.1 PG (ref 27–31)
MCHC RBC AUTO-ENTMCNC: 33.8 G/DL (ref 32–36)
MCV RBC AUTO: 92 FL (ref 82–98)
MICROALBUMIN UR DL<=1MG/L-MCNC: 7.5 MG/L
MONOCYTES # BLD AUTO: 0.7 K/UL (ref 0.3–1)
MONOCYTES NFR BLD: 8.6 % (ref 4–15)
NEUTROPHILS # BLD AUTO: 5.7 K/UL (ref 1.8–7.7)
NEUTROPHILS NFR BLD: 67.6 % (ref 38–73)
NONHDLC SERPL-MCNC: 73 MG/DL
NRBC BLD-RTO: 0 /100 WBC
PLATELET # BLD AUTO: 194 K/UL (ref 150–450)
PMV BLD AUTO: 10.5 FL (ref 9.2–12.9)
POTASSIUM SERPL-SCNC: 4.7 MMOL/L (ref 3.5–5.1)
PROT SERPL-MCNC: 7.9 G/DL (ref 6–8.4)
RBC # BLD AUTO: 4.99 M/UL (ref 4.6–6.2)
SODIUM SERPL-SCNC: 136 MMOL/L (ref 136–145)
TRIGL SERPL-MCNC: 58 MG/DL (ref 30–150)
WBC # BLD AUTO: 8.38 K/UL (ref 3.9–12.7)

## 2023-09-19 PROCEDURE — 36415 COLL VENOUS BLD VENIPUNCTURE: CPT | Performed by: INTERNAL MEDICINE

## 2023-09-19 PROCEDURE — 83735 ASSAY OF MAGNESIUM: CPT | Performed by: INTERNAL MEDICINE

## 2023-09-19 PROCEDURE — 85025 COMPLETE CBC W/AUTO DIFF WBC: CPT | Performed by: INTERNAL MEDICINE

## 2023-09-19 PROCEDURE — 80053 COMPREHEN METABOLIC PANEL: CPT | Performed by: INTERNAL MEDICINE

## 2023-09-19 PROCEDURE — 80061 LIPID PANEL: CPT | Performed by: INTERNAL MEDICINE

## 2023-09-19 PROCEDURE — 82043 UR ALBUMIN QUANTITATIVE: CPT | Performed by: INTERNAL MEDICINE

## 2023-09-19 PROCEDURE — 83036 HEMOGLOBIN GLYCOSYLATED A1C: CPT | Performed by: INTERNAL MEDICINE

## 2023-10-05 PROBLEM — M79.605 LEFT LEG PAIN: Status: ACTIVE | Noted: 2023-10-05

## 2023-10-27 PROBLEM — H92.01 ACUTE OTALGIA, RIGHT: Status: RESOLVED | Noted: 2022-12-12 | Resolved: 2023-10-27

## 2023-10-27 PROBLEM — U07.1 COVID-19: Status: RESOLVED | Noted: 2022-02-01 | Resolved: 2023-10-27

## 2023-10-27 PROBLEM — H72.91 OTITIS MEDIA, SEROUS, TM RUPTURE, RIGHT: Status: RESOLVED | Noted: 2022-01-10 | Resolved: 2023-10-27

## 2023-10-27 PROBLEM — H65.91 OTITIS MEDIA, SEROUS, TM RUPTURE, RIGHT: Status: RESOLVED | Noted: 2022-01-10 | Resolved: 2023-10-27

## 2023-10-27 PROBLEM — J32.9 SINUSITIS: Status: RESOLVED | Noted: 2022-12-12 | Resolved: 2023-10-27

## 2024-03-23 ENCOUNTER — HOSPITAL ENCOUNTER (EMERGENCY)
Facility: HOSPITAL | Age: 69
Discharge: HOME | End: 2024-03-23
Attending: EMERGENCY MEDICINE
Payer: MEDICARE

## 2024-03-23 VITALS
BODY MASS INDEX: 36.6 KG/M2 | TEMPERATURE: 98 F | OXYGEN SATURATION: 97 % | HEIGHT: 70 IN | DIASTOLIC BLOOD PRESSURE: 74 MMHG | WEIGHT: 255.63 LBS | RESPIRATION RATE: 18 BRPM | HEART RATE: 88 BPM | SYSTOLIC BLOOD PRESSURE: 155 MMHG

## 2024-03-23 DIAGNOSIS — S69.92XA FISH HOOK INJURY OF FINGER OF LEFT HAND, INITIAL ENCOUNTER: Primary | ICD-10-CM

## 2024-03-23 PROCEDURE — 25000003 PHARM REV CODE 250: Performed by: NURSE PRACTITIONER

## 2024-03-23 PROCEDURE — 90715 TDAP VACCINE 7 YRS/> IM: CPT | Performed by: NURSE PRACTITIONER

## 2024-03-23 PROCEDURE — 90471 IMMUNIZATION ADMIN: CPT | Performed by: NURSE PRACTITIONER

## 2024-03-23 PROCEDURE — 63600175 PHARM REV CODE 636 W HCPCS: Performed by: NURSE PRACTITIONER

## 2024-03-23 PROCEDURE — 10120 INC&RMVL FB SUBQ TISS SMPL: CPT

## 2024-03-23 PROCEDURE — 99284 EMERGENCY DEPT VISIT MOD MDM: CPT | Mod: 25

## 2024-03-23 RX ORDER — DOXYCYCLINE 100 MG/1
100 CAPSULE ORAL 2 TIMES DAILY
Qty: 14 CAPSULE | Refills: 0 | Status: SHIPPED | OUTPATIENT
Start: 2024-03-23 | End: 2024-03-30

## 2024-03-23 RX ORDER — LIDOCAINE HYDROCHLORIDE 10 MG/ML
5 INJECTION INFILTRATION; PERINEURAL
Status: COMPLETED | OUTPATIENT
Start: 2024-03-23 | End: 2024-03-23

## 2024-03-23 RX ORDER — CEPHALEXIN 500 MG/1
500 CAPSULE ORAL EVERY 8 HOURS
Qty: 21 CAPSULE | Refills: 0 | Status: SHIPPED | OUTPATIENT
Start: 2024-03-23 | End: 2024-03-30

## 2024-03-23 RX ADMIN — LIDOCAINE HYDROCHLORIDE 5 ML: 10 INJECTION, SOLUTION INFILTRATION; PERINEURAL at 06:03

## 2024-03-23 RX ADMIN — TETANUS TOXOID, REDUCED DIPHTHERIA TOXOID AND ACELLULAR PERTUSSIS VACCINE, ADSORBED 0.5 ML: 5; 2.5; 8; 8; 2.5 SUSPENSION INTRAMUSCULAR at 06:03

## 2024-03-23 NOTE — DISCHARGE INSTRUCTIONS
Wash affected area twice daily with Dial soap and water.  Be sure to complete all antibiotics.  Plan to follow-up with your doctor next week for wound check.  Return to the emergency department for worsening condition.

## 2024-03-23 NOTE — ED PROVIDER NOTES
"Encounter Date: 3/23/2024       History     Chief Complaint   Patient presents with    Foreign Body in Skin     Pt to ED with fish hook impaled to left thumb shortly prior to arrival.      This is a 68-year-old white male with a history of diabetes mellitus type 2 and hypertension who presents to the emergency department with complaints of fishhook stuck in left thumb that occurred just prior to arrival while fishing in [place] water.  He reports that he did "force it all the way through the skin" in an effort to cut the kathe off but was not successful.  He denies any other injuries or concerns at this time.  Patient is not up-to-date on tetanus.      Review of patient's allergies indicates:   Allergen Reactions    Codeine      Past Medical History:   Diagnosis Date    Arthritis     Cerumen impaction 11/9/2021    Diabetes     GERD (gastroesophageal reflux disease)     Hyperlipidemia     Hypertension     PATRICIA (obstructive sleep apnea)     Otitis media, serous, tm rupture, right 1/10/2022     Past Surgical History:   Procedure Laterality Date    HERNIA REPAIR      MYRINGOTOMY W/ TUBES       Family History   Problem Relation Age of Onset    Colon cancer Mother     Diabetes Mother     Coronary artery disease Father     Cancer Father     Diabetes Father      Social History     Tobacco Use    Smoking status: Never    Smokeless tobacco: Never   Substance Use Topics    Alcohol use: Yes     Alcohol/week: 2.0 standard drinks of alcohol     Types: 2 Glasses of wine per week    Drug use: Never     Review of Systems   Skin:  Positive for wound.   Neurological:  Negative for numbness.       Physical Exam     Initial Vitals   BP Pulse Resp Temp SpO2   03/23/24 1810 03/23/24 1810 03/23/24 1810 03/23/24 1810 03/23/24 1811   (!) 155/74 88 18 98.3 °F (36.8 °C) 97 %      MAP       --                Physical Exam    Nursing note and vitals reviewed.  Constitutional: He appears well-developed and well-nourished. He is active. No distress. "   HENT:   Head: Normocephalic and atraumatic.   Eyes: EOM are normal. Pupils are equal, round, and reactive to light.   Neck: Neck supple.   Normal range of motion.  Cardiovascular:  Normal rate.           Pulmonary/Chest: No respiratory distress.   Musculoskeletal:         General: Normal range of motion.      Cervical back: Normal range of motion and neck supple.     Neurological: He is alert and oriented to person, place, and time. GCS score is 15. GCS eye subscore is 4. GCS verbal subscore is 5. GCS motor subscore is 6.   Skin: Skin is warm and dry. Capillary refill takes less than 2 seconds.   Carmichaels embedded in left thumb pad. No active bleeding.   Psychiatric: He has a normal mood and affect. His behavior is normal. Thought content normal.         ED Course   Foreign Body    Date/Time: 3/23/2024 6:50 PM    Performed by: Court Negrete NP  Authorized by: Court Negrete NP  Body area: skin  Anesthesia: local infiltration    Anesthesia:  Local Anesthetic: lidocaine 1% without epinephrine  Anesthetic total: 1 mL    Patient sedated: no  Localization method: visualized  Removal mechanism: scalpel  Dressing: dressing applied  Tendon involvement: none  Depth: subcutaneous  Complexity: simple  1 objects recovered.  Objects recovered: Carmichaels  Post-procedure assessment: foreign body removed  Patient tolerance: Patient tolerated the procedure well with no immediate complications  Comments: The thumb was prepped with Betadine and fishhook was advanced through the skin, kathe removed, and remaining fishhook reversed through puncture wound.  Patient tolerated well.      Labs Reviewed - No data to display       Imaging Results              X-Ray Finger 2 or More Views Left (In process)                      Medications   LIDOcaine HCL 10 mg/ml (1%) injection 5 mL (5 mLs Other Given 3/23/24 1822)   Tdap (BOOSTRIX) vaccine injection 0.5 mL (0.5 mLs Intramuscular Given 3/23/24 1829)     Medical Decision  Making  Amount and/or Complexity of Data Reviewed  Radiology: ordered.    Risk  Prescription drug management.               ED Course as of 03/23/24 1854   Sat Mar 23, 2024   1850 X-ray of left thumb reveals evidence of fishhook imbedded in soft tissue, no obvious bone injury or other residual foreign body [CB]      ED Course User Index  [CB] Court Negrete NP                           Clinical Impression:  Final diagnoses:  [S69.92XA] Fish hook injury of finger of left hand, initial encounter (Primary)          ED Disposition Condition    Discharge Stable          ED Prescriptions       Medication Sig Dispense Start Date End Date Auth. Provider    doxycycline (VIBRAMYCIN) 100 MG Cap Take 1 capsule (100 mg total) by mouth 2 (two) times daily. for 7 days 14 capsule 3/23/2024 3/30/2024 Court Negrete NP    cephALEXin (KEFLEX) 500 MG capsule Take 1 capsule (500 mg total) by mouth every 8 (eight) hours. for 7 days 21 capsule 3/23/2024 3/30/2024 Court Negrete NP          Follow-up Information       Follow up With Specialties Details Why Contact Info    Dustin Juarez Jr., MD Internal Medicine Schedule an appointment as soon as possible for a visit in 2 days for re-evaluation of today's complaint, For wound re-check [address] [place] [id number]  [phone]               Court Negrete NP  03/23/24 1854

## 2024-03-25 PROBLEM — S69.90XA: Status: ACTIVE | Noted: 2024-03-25

## 2024-05-10 ENCOUNTER — LAB VISIT (OUTPATIENT)
Dept: LAB | Facility: HOSPITAL | Age: 69
End: 2024-05-10
Attending: INTERNAL MEDICINE
Payer: MEDICARE

## 2024-05-10 DIAGNOSIS — E11.65 TYPE 2 DIABETES MELLITUS WITH HYPERGLYCEMIA, WITHOUT LONG-TERM CURRENT USE OF INSULIN: ICD-10-CM

## 2024-05-10 DIAGNOSIS — E78.2 MIXED HYPERLIPIDEMIA: ICD-10-CM

## 2024-05-10 DIAGNOSIS — E78.5 HYPERLIPIDEMIA, UNSPECIFIED HYPERLIPIDEMIA TYPE: ICD-10-CM

## 2024-05-10 DIAGNOSIS — E63.0 ESSENTIAL FATTY ACID (EFA) DEFICIENCY: ICD-10-CM

## 2024-05-10 DIAGNOSIS — E66.9 OBESITY, UNSPECIFIED CLASSIFICATION, UNSPECIFIED OBESITY TYPE, UNSPECIFIED WHETHER SERIOUS COMORBIDITY PRESENT: ICD-10-CM

## 2024-05-10 DIAGNOSIS — E55.9 VITAMIN D DEFICIENCY: ICD-10-CM

## 2024-05-10 DIAGNOSIS — I65.22 STENOSIS OF LEFT CAROTID ARTERY: ICD-10-CM

## 2024-05-10 DIAGNOSIS — D64.9 ANEMIA, UNSPECIFIED TYPE: ICD-10-CM

## 2024-05-10 DIAGNOSIS — E88.89 DEFICIENCY OF COENZYME Q10: ICD-10-CM

## 2024-05-10 DIAGNOSIS — Z79.899 ENCOUNTER FOR LONG-TERM (CURRENT) USE OF OTHER MEDICATIONS: ICD-10-CM

## 2024-05-10 LAB
ALBUMIN SERPL BCP-MCNC: 3.6 G/DL (ref 3.5–5.2)
ALBUMIN/CREAT UR: NORMAL UG/MG (ref 0–30)
ALP SERPL-CCNC: 70 U/L (ref 55–135)
ALT SERPL W/O P-5'-P-CCNC: 31 U/L (ref 10–44)
ANION GAP SERPL CALC-SCNC: 3 MMOL/L (ref 3–11)
AST SERPL-CCNC: 21 U/L (ref 10–40)
BASOPHILS # BLD AUTO: 0.05 K/UL (ref 0–0.2)
BASOPHILS NFR BLD: 1.2 % (ref 0–1.9)
BILIRUB SERPL-MCNC: 0.4 MG/DL (ref 0.1–1)
BUN SERPL-MCNC: 22 MG/DL (ref 8–23)
CALCIUM SERPL-MCNC: 9.4 MG/DL (ref 8.7–10.5)
CHLORIDE SERPL-SCNC: 109 MMOL/L (ref 95–110)
CHOLEST SERPL-MCNC: 116 MG/DL (ref 120–199)
CHOLEST/HDLC SERPL: 2.6 {RATIO} (ref 2–5)
CO2 SERPL-SCNC: 28 MMOL/L (ref 23–29)
CREAT SERPL-MCNC: 1 MG/DL (ref 0.5–1.4)
CREAT UR-MCNC: 94.4 MG/DL (ref 23–375)
DIFFERENTIAL METHOD BLD: ABNORMAL
EOSINOPHIL # BLD AUTO: 0.2 K/UL (ref 0–0.5)
EOSINOPHIL NFR BLD: 5.8 % (ref 0–8)
ERYTHROCYTE [DISTWIDTH] IN BLOOD BY AUTOMATED COUNT: 12.3 % (ref 11.5–14.5)
EST. GFR  (NO RACE VARIABLE): >60 ML/MIN/1.73 M^2
ESTIMATED AVG GLUCOSE: 131 MG/DL (ref 68–131)
GLUCOSE SERPL-MCNC: 129 MG/DL (ref 70–110)
HBA1C MFR BLD: 6.2 % (ref 4–5.6)
HCT VFR BLD AUTO: 39.5 % (ref 40–54)
HDLC SERPL-MCNC: 44 MG/DL (ref 40–75)
HDLC SERPL: 37.9 % (ref 20–50)
HGB BLD-MCNC: 13.7 G/DL (ref 14–18)
IMM GRANULOCYTES # BLD AUTO: 0.01 K/UL (ref 0–0.04)
IMM GRANULOCYTES NFR BLD AUTO: 0.2 % (ref 0–0.5)
LDLC SERPL CALC-MCNC: 64.8 MG/DL (ref 63–159)
LYMPHOCYTES # BLD AUTO: 1.5 K/UL (ref 1–4.8)
LYMPHOCYTES NFR BLD: 35 % (ref 18–48)
MCH RBC QN AUTO: 31.1 PG (ref 27–31)
MCHC RBC AUTO-ENTMCNC: 34.7 G/DL (ref 32–36)
MCV RBC AUTO: 90 FL (ref 82–98)
MICROALBUMIN UR DL<=1MG/L-MCNC: <5 MG/L
MONOCYTES # BLD AUTO: 0.5 K/UL (ref 0.3–1)
MONOCYTES NFR BLD: 13 % (ref 4–15)
NEUTROPHILS # BLD AUTO: 1.9 K/UL (ref 1.8–7.7)
NEUTROPHILS NFR BLD: 44.8 % (ref 38–73)
NONHDLC SERPL-MCNC: 72 MG/DL
NRBC BLD-RTO: 0 /100 WBC
PLATELET # BLD AUTO: 206 K/UL (ref 150–450)
PMV BLD AUTO: 10.2 FL (ref 9.2–12.9)
POTASSIUM SERPL-SCNC: 4.8 MMOL/L (ref 3.5–5.1)
PROT SERPL-MCNC: 7.2 G/DL (ref 6–8.4)
RBC # BLD AUTO: 4.4 M/UL (ref 4.6–6.2)
SODIUM SERPL-SCNC: 140 MMOL/L (ref 136–145)
TRIGL SERPL-MCNC: 36 MG/DL (ref 30–150)
WBC # BLD AUTO: 4.14 K/UL (ref 3.9–12.7)

## 2024-05-10 PROCEDURE — 36415 COLL VENOUS BLD VENIPUNCTURE: CPT | Performed by: INTERNAL MEDICINE

## 2024-05-10 PROCEDURE — 80053 COMPREHEN METABOLIC PANEL: CPT | Performed by: INTERNAL MEDICINE

## 2024-05-10 PROCEDURE — 80061 LIPID PANEL: CPT | Performed by: INTERNAL MEDICINE

## 2024-05-10 PROCEDURE — 85025 COMPLETE CBC W/AUTO DIFF WBC: CPT | Performed by: INTERNAL MEDICINE

## 2024-05-10 PROCEDURE — 82043 UR ALBUMIN QUANTITATIVE: CPT | Performed by: INTERNAL MEDICINE

## 2024-05-10 PROCEDURE — 83036 HEMOGLOBIN GLYCOSYLATED A1C: CPT | Performed by: INTERNAL MEDICINE

## 2024-06-18 PROBLEM — L60.0 INGROWING NAIL: Status: RESOLVED | Noted: 2022-10-06 | Resolved: 2024-06-18

## 2024-06-18 PROBLEM — Z00.00 ROUTINE GENERAL MEDICAL EXAMINATION AT A HEALTH CARE FACILITY: Status: ACTIVE | Noted: 2024-06-18

## 2024-07-01 PROBLEM — E66.01 SEVERE OBESITY (BMI 35.0-39.9) WITH COMORBIDITY: Status: ACTIVE | Noted: 2021-03-22

## 2024-07-01 PROBLEM — L98.499 CALLOUS ULCER: Status: RESOLVED | Noted: 2021-03-22 | Resolved: 2024-07-01

## 2024-07-01 PROBLEM — J02.9 SORE THROAT: Status: ACTIVE | Noted: 2024-07-01

## 2024-08-05 ENCOUNTER — HOSPITAL ENCOUNTER (OUTPATIENT)
Dept: RADIOLOGY | Facility: HOSPITAL | Age: 69
Discharge: HOME | End: 2024-08-05
Attending: FAMILY MEDICINE
Payer: MEDICARE

## 2024-08-05 DIAGNOSIS — M79.604 RIGHT LEG PAIN: ICD-10-CM

## 2024-08-05 PROCEDURE — 73721 MRI JNT OF LWR EXTRE W/O DYE: CPT | Mod: TC,RT
